# Patient Record
Sex: FEMALE | Race: WHITE | NOT HISPANIC OR LATINO | URBAN - METROPOLITAN AREA
[De-identification: names, ages, dates, MRNs, and addresses within clinical notes are randomized per-mention and may not be internally consistent; named-entity substitution may affect disease eponyms.]

---

## 2017-01-16 ENCOUNTER — PRENATAL OFFICE VISIT - HEALTHEAST (OUTPATIENT)
Dept: MIDWIFE SERVICES | Facility: CLINIC | Age: 27
End: 2017-01-16

## 2017-01-16 DIAGNOSIS — O09.891 SHORT INTERVAL BETWEEN PREGNANCIES AFFECTING PREGNANCY IN FIRST TRIMESTER, ANTEPARTUM: ICD-10-CM

## 2017-01-16 DIAGNOSIS — E55.9 VITAMIN D DEFICIENCY: ICD-10-CM

## 2017-01-16 ASSESSMENT — MIFFLIN-ST. JEOR: SCORE: 1418.28

## 2017-01-17 ENCOUNTER — AMBULATORY - HEALTHEAST (OUTPATIENT)
Dept: LAB | Facility: CLINIC | Age: 27
End: 2017-01-17

## 2017-01-17 DIAGNOSIS — O09.891 SHORT INTERVAL BETWEEN PREGNANCIES AFFECTING PREGNANCY IN FIRST TRIMESTER, ANTEPARTUM: ICD-10-CM

## 2017-01-20 ENCOUNTER — COMMUNICATION - HEALTHEAST (OUTPATIENT)
Dept: MIDWIFE SERVICES | Facility: CLINIC | Age: 27
End: 2017-01-20

## 2017-01-20 DIAGNOSIS — Z34.82 ENCOUNTER FOR SUPERVISION OF OTHER NORMAL PREGNANCY IN SECOND TRIMESTER: ICD-10-CM

## 2017-02-10 ENCOUNTER — HOSPITAL ENCOUNTER (OUTPATIENT)
Dept: ULTRASOUND IMAGING | Facility: HOSPITAL | Age: 27
Discharge: HOME | End: 2017-02-10
Attending: ADVANCED PRACTICE MIDWIFE

## 2017-02-10 DIAGNOSIS — Z34.82 ENCOUNTER FOR SUPERVISION OF OTHER NORMAL PREGNANCY IN SECOND TRIMESTER: ICD-10-CM

## 2017-02-13 ENCOUNTER — PRENATAL OFFICE VISIT - HEALTHEAST (OUTPATIENT)
Dept: MIDWIFE SERVICES | Facility: CLINIC | Age: 27
End: 2017-02-13

## 2017-02-13 ENCOUNTER — AMBULATORY - HEALTHEAST (OUTPATIENT)
Dept: MIDWIFE SERVICES | Facility: CLINIC | Age: 27
End: 2017-02-13

## 2017-02-13 DIAGNOSIS — O09.892 SHORT INTERVAL BETWEEN PREGNANCIES COMPLICATING PREGNANCY, ANTEPARTUM, SECOND TRIMESTER: ICD-10-CM

## 2017-02-13 ASSESSMENT — MIFFLIN-ST. JEOR: SCORE: 1425.99

## 2017-03-20 ENCOUNTER — PRENATAL OFFICE VISIT - HEALTHEAST (OUTPATIENT)
Dept: MIDWIFE SERVICES | Facility: CLINIC | Age: 27
End: 2017-03-20

## 2017-03-20 DIAGNOSIS — O09.891 SHORT INTERVAL BETWEEN PREGNANCIES AFFECTING PREGNANCY IN FIRST TRIMESTER, ANTEPARTUM: ICD-10-CM

## 2017-03-20 ASSESSMENT — MIFFLIN-ST. JEOR: SCORE: 1454.12

## 2017-04-17 ENCOUNTER — PRENATAL OFFICE VISIT - HEALTHEAST (OUTPATIENT)
Dept: MIDWIFE SERVICES | Facility: CLINIC | Age: 27
End: 2017-04-17

## 2017-04-17 DIAGNOSIS — Z34.80 ENCOUNTER FOR SUPERVISION OF OTHER NORMAL PREGNANCY: ICD-10-CM

## 2017-04-17 DIAGNOSIS — Z00.00 HEALTH CARE MAINTENANCE: ICD-10-CM

## 2017-04-17 ASSESSMENT — MIFFLIN-ST. JEOR: SCORE: 1480.2

## 2017-04-18 LAB — SYPHILIS RPR SCREEN - HISTORICAL: NORMAL

## 2017-05-15 ENCOUNTER — PRENATAL OFFICE VISIT - HEALTHEAST (OUTPATIENT)
Dept: MIDWIFE SERVICES | Facility: CLINIC | Age: 27
End: 2017-05-15

## 2017-05-15 DIAGNOSIS — Z34.83 ENCOUNTER FOR SUPERVISION OF OTHER NORMAL PREGNANCY IN THIRD TRIMESTER: ICD-10-CM

## 2017-05-15 ASSESSMENT — MIFFLIN-ST. JEOR: SCORE: 1516.94

## 2017-05-31 ENCOUNTER — PRENATAL OFFICE VISIT - HEALTHEAST (OUTPATIENT)
Dept: MIDWIFE SERVICES | Facility: CLINIC | Age: 27
End: 2017-05-31

## 2017-05-31 DIAGNOSIS — Z34.83 ENCOUNTER FOR SUPERVISION OF OTHER NORMAL PREGNANCY, THIRD TRIMESTER: ICD-10-CM

## 2017-05-31 ASSESSMENT — MIFFLIN-ST. JEOR: SCORE: 1520.11

## 2017-06-12 ENCOUNTER — HOSPITAL ENCOUNTER (OUTPATIENT)
Dept: ULTRASOUND IMAGING | Facility: HOSPITAL | Age: 27
Discharge: HOME | End: 2017-06-12
Attending: ADVANCED PRACTICE MIDWIFE

## 2017-06-12 ENCOUNTER — COMMUNICATION - HEALTHEAST (OUTPATIENT)
Dept: OBGYN | Facility: CLINIC | Age: 27
End: 2017-06-12

## 2017-06-12 ENCOUNTER — PRENATAL OFFICE VISIT - HEALTHEAST (OUTPATIENT)
Dept: MIDWIFE SERVICES | Facility: CLINIC | Age: 27
End: 2017-06-12

## 2017-06-12 DIAGNOSIS — Z34.80 ENCOUNTER FOR SUPERVISION OF OTHER NORMAL PREGNANCY: ICD-10-CM

## 2017-06-12 DIAGNOSIS — O32.9XX0: ICD-10-CM

## 2017-06-12 ASSESSMENT — MIFFLIN-ST. JEOR: SCORE: 1535.08

## 2017-06-19 ENCOUNTER — PRENATAL OFFICE VISIT - HEALTHEAST (OUTPATIENT)
Dept: MIDWIFE SERVICES | Facility: CLINIC | Age: 27
End: 2017-06-19

## 2017-06-19 ENCOUNTER — COMMUNICATION - HEALTHEAST (OUTPATIENT)
Dept: ADMINISTRATIVE | Facility: CLINIC | Age: 27
End: 2017-06-19

## 2017-06-19 DIAGNOSIS — Z34.83 ENCOUNTER FOR SUPERVISION OF OTHER NORMAL PREGNANCY, THIRD TRIMESTER: ICD-10-CM

## 2017-06-19 DIAGNOSIS — O32.0XX0 UNSTABLE LIE OF FETUS: ICD-10-CM

## 2017-06-19 DIAGNOSIS — O32.0XX0: ICD-10-CM

## 2017-06-19 ASSESSMENT — MIFFLIN-ST. JEOR: SCORE: 1548.69

## 2017-06-25 ENCOUNTER — COMMUNICATION - HEALTHEAST (OUTPATIENT)
Dept: MIDWIFE SERVICES | Facility: CLINIC | Age: 27
End: 2017-06-25

## 2017-06-26 ENCOUNTER — RECORDS - HEALTHEAST (OUTPATIENT)
Dept: ADMINISTRATIVE | Facility: OTHER | Age: 27
End: 2017-06-26

## 2017-06-26 ENCOUNTER — PRENATAL OFFICE VISIT - HEALTHEAST (OUTPATIENT)
Dept: MIDWIFE SERVICES | Facility: CLINIC | Age: 27
End: 2017-06-26

## 2017-06-26 DIAGNOSIS — Z34.83 ENCOUNTER FOR SUPERVISION OF OTHER NORMAL PREGNANCY, THIRD TRIMESTER: ICD-10-CM

## 2017-06-26 DIAGNOSIS — B37.31 VAGINAL YEAST INFECTION: ICD-10-CM

## 2017-06-26 DIAGNOSIS — R30.0 DYSURIA: ICD-10-CM

## 2017-06-26 ASSESSMENT — MIFFLIN-ST. JEOR: SCORE: 1560.94

## 2017-06-28 ENCOUNTER — AMBULATORY - HEALTHEAST (OUTPATIENT)
Dept: MIDWIFE SERVICES | Facility: CLINIC | Age: 27
End: 2017-06-28

## 2017-07-02 ENCOUNTER — HOSPITAL ENCOUNTER (OUTPATIENT)
Dept: MEDSURG UNIT | Facility: CLINIC | Age: 27
Discharge: HOME | End: 2017-07-02
Attending: MIDWIFE | Admitting: MIDWIFE

## 2017-07-02 DIAGNOSIS — O09.891 SHORT INTERVAL BETWEEN PREGNANCIES AFFECTING PREGNANCY IN FIRST TRIMESTER, ANTEPARTUM: ICD-10-CM

## 2017-07-02 DIAGNOSIS — Z34.83 ENCOUNTER FOR SUPERVISION OF OTHER NORMAL PREGNANCY, THIRD TRIMESTER: ICD-10-CM

## 2017-07-02 DIAGNOSIS — B37.31 VAGINAL YEAST INFECTION: ICD-10-CM

## 2017-07-02 DIAGNOSIS — O32.0XX0: ICD-10-CM

## 2017-07-02 DIAGNOSIS — E55.9 VITAMIN D DEFICIENCY: ICD-10-CM

## 2017-07-02 ASSESSMENT — MIFFLIN-ST. JEOR: SCORE: 1557.76

## 2017-07-03 ENCOUNTER — PRENATAL OFFICE VISIT - HEALTHEAST (OUTPATIENT)
Dept: MIDWIFE SERVICES | Facility: CLINIC | Age: 27
End: 2017-07-03

## 2017-07-03 DIAGNOSIS — Z34.83 ENCOUNTER FOR SUPERVISION OF OTHER NORMAL PREGNANCY, THIRD TRIMESTER: ICD-10-CM

## 2017-07-03 ASSESSMENT — MIFFLIN-ST. JEOR: SCORE: 1558.67

## 2017-07-10 ENCOUNTER — PRENATAL OFFICE VISIT - HEALTHEAST (OUTPATIENT)
Dept: MIDWIFE SERVICES | Facility: CLINIC | Age: 27
End: 2017-07-10

## 2017-07-10 DIAGNOSIS — Z34.83 ENCOUNTER FOR SUPERVISION OF OTHER NORMAL PREGNANCY, THIRD TRIMESTER: ICD-10-CM

## 2017-07-10 ASSESSMENT — MIFFLIN-ST. JEOR: SCORE: 1567.29

## 2017-07-14 ENCOUNTER — COMMUNICATION - HEALTHEAST (OUTPATIENT)
Dept: OBGYN | Facility: CLINIC | Age: 27
End: 2017-07-14

## 2017-07-17 ENCOUNTER — COMMUNICATION - HEALTHEAST (OUTPATIENT)
Dept: OBGYN | Facility: CLINIC | Age: 27
End: 2017-07-17

## 2017-07-20 ENCOUNTER — COMMUNICATION - HEALTHEAST (OUTPATIENT)
Dept: OBGYN | Facility: HOSPITAL | Age: 27
End: 2017-07-20

## 2017-08-21 ENCOUNTER — OFFICE VISIT - HEALTHEAST (OUTPATIENT)
Dept: MIDWIFE SERVICES | Facility: CLINIC | Age: 27
End: 2017-08-21

## 2017-08-21 ASSESSMENT — MIFFLIN-ST. JEOR: SCORE: 1444.36

## 2017-08-28 ENCOUNTER — OFFICE VISIT - HEALTHEAST (OUTPATIENT)
Dept: MIDWIFE SERVICES | Facility: CLINIC | Age: 27
End: 2017-08-28

## 2017-08-28 DIAGNOSIS — Z30.430 ENCOUNTER FOR IUD INSERTION: ICD-10-CM

## 2017-08-28 ASSESSMENT — MIFFLIN-ST. JEOR: SCORE: 1481.56

## 2017-10-09 ENCOUNTER — OFFICE VISIT - HEALTHEAST (OUTPATIENT)
Dept: MIDWIFE SERVICES | Facility: CLINIC | Age: 27
End: 2017-10-09

## 2017-10-09 DIAGNOSIS — O92.29 POSTPARTUM NIPPLE PAIN: ICD-10-CM

## 2017-10-09 DIAGNOSIS — Z00.00 HEALTH CARE MAINTENANCE: ICD-10-CM

## 2017-10-09 DIAGNOSIS — Z30.431 IUD CHECK UP: ICD-10-CM

## 2017-10-09 ASSESSMENT — MIFFLIN-ST. JEOR: SCORE: 1503.33

## 2017-10-27 ENCOUNTER — COMMUNICATION - HEALTHEAST (OUTPATIENT)
Dept: MIDWIFE SERVICES | Facility: CLINIC | Age: 27
End: 2017-10-27

## 2017-11-03 ENCOUNTER — COMMUNICATION - HEALTHEAST (OUTPATIENT)
Dept: OBGYN | Facility: CLINIC | Age: 27
End: 2017-11-03

## 2017-11-06 ENCOUNTER — OFFICE VISIT - HEALTHEAST (OUTPATIENT)
Dept: MIDWIFE SERVICES | Facility: CLINIC | Age: 27
End: 2017-11-06

## 2017-11-06 DIAGNOSIS — Z30.432 ENCOUNTER FOR IUD REMOVAL: ICD-10-CM

## 2017-11-06 DIAGNOSIS — Z30.09 GENERAL COUNSELING FOR PRESCRIPTION OF ORAL CONTRACEPTIVES: ICD-10-CM

## 2017-11-06 ASSESSMENT — MIFFLIN-ST. JEOR: SCORE: 1505.14

## 2018-10-22 ENCOUNTER — AMBULATORY - HEALTHEAST (OUTPATIENT)
Dept: MIDWIFE SERVICES | Facility: CLINIC | Age: 28
End: 2018-10-22

## 2018-10-22 ENCOUNTER — PRENATAL OFFICE VISIT - HEALTHEAST (OUTPATIENT)
Dept: MIDWIFE SERVICES | Facility: CLINIC | Age: 28
End: 2018-10-22

## 2018-10-22 DIAGNOSIS — Z34.80 SUPERVISION OF OTHER NORMAL PREGNANCY, ANTEPARTUM: ICD-10-CM

## 2018-10-22 LAB
BASOPHILS # BLD AUTO: 0 THOU/UL (ref 0–0.2)
BASOPHILS NFR BLD AUTO: 0 % (ref 0–2)
EOSINOPHIL # BLD AUTO: 0.1 THOU/UL (ref 0–0.4)
EOSINOPHIL NFR BLD AUTO: 1 % (ref 0–6)
ERYTHROCYTE [DISTWIDTH] IN BLOOD BY AUTOMATED COUNT: 12.2 % (ref 11–14.5)
HCT VFR BLD AUTO: 39.4 % (ref 35–47)
HGB BLD-MCNC: 13.7 G/DL (ref 12–16)
LYMPHOCYTES # BLD AUTO: 2.3 THOU/UL (ref 0.8–4.4)
LYMPHOCYTES NFR BLD AUTO: 29 % (ref 20–40)
MCH RBC QN AUTO: 31.6 PG (ref 27–34)
MCHC RBC AUTO-ENTMCNC: 34.8 G/DL (ref 32–36)
MCV RBC AUTO: 91 FL (ref 80–100)
MONOCYTES # BLD AUTO: 0.5 THOU/UL (ref 0–0.9)
MONOCYTES NFR BLD AUTO: 7 % (ref 2–10)
NEUTROPHILS # BLD AUTO: 5 THOU/UL (ref 2–7.7)
NEUTROPHILS NFR BLD AUTO: 63 % (ref 50–70)
PLATELET # BLD AUTO: 214 THOU/UL (ref 140–440)
PMV BLD AUTO: 10.9 FL (ref 8.5–12.5)
RBC # BLD AUTO: 4.34 MILL/UL (ref 3.8–5.4)
WBC: 8 THOU/UL (ref 4–11)

## 2018-10-22 ASSESSMENT — MIFFLIN-ST. JEOR: SCORE: 1439.37

## 2018-10-23 LAB
ABO/RH(D): NORMAL
ABORH REPEAT: NORMAL
ANTIBODY SCREEN: NEGATIVE
BACTERIA SPEC CULT: NO GROWTH
HIV 1+2 AB+HIV1 P24 AG SERPL QL IA: NEGATIVE

## 2018-10-24 LAB
HBV SURFACE AG SERPL QL IA: NEGATIVE
RUBV IGG SERPL QL IA: POSITIVE
T PALLIDUM AB SER QL: NEGATIVE

## 2018-10-26 ENCOUNTER — AMBULATORY - HEALTHEAST (OUTPATIENT)
Dept: MIDWIFE SERVICES | Facility: CLINIC | Age: 28
End: 2018-10-26

## 2018-10-26 ENCOUNTER — COMMUNICATION - HEALTHEAST (OUTPATIENT)
Dept: MIDWIFE SERVICES | Facility: CLINIC | Age: 28
End: 2018-10-26

## 2018-10-26 DIAGNOSIS — Z34.80 SUPERVISION OF OTHER NORMAL PREGNANCY, ANTEPARTUM: ICD-10-CM

## 2018-10-29 ENCOUNTER — COMMUNICATION - HEALTHEAST (OUTPATIENT)
Dept: MIDWIFE SERVICES | Facility: CLINIC | Age: 28
End: 2018-10-29

## 2018-12-10 ENCOUNTER — COMMUNICATION - HEALTHEAST (OUTPATIENT)
Dept: MIDWIFE SERVICES | Facility: CLINIC | Age: 28
End: 2018-12-10

## 2018-12-10 ENCOUNTER — PRENATAL OFFICE VISIT - HEALTHEAST (OUTPATIENT)
Dept: MIDWIFE SERVICES | Facility: CLINIC | Age: 28
End: 2018-12-10

## 2018-12-10 DIAGNOSIS — Z34.82 ENCOUNTER FOR SUPERVISION OF OTHER NORMAL PREGNANCY IN SECOND TRIMESTER: ICD-10-CM

## 2018-12-10 DIAGNOSIS — R51.9 PERSISTENT HEADACHES: ICD-10-CM

## 2018-12-10 DIAGNOSIS — Z23 NEED FOR INFLUENZA VACCINATION: ICD-10-CM

## 2018-12-10 ASSESSMENT — MIFFLIN-ST. JEOR: SCORE: 1448.9

## 2018-12-12 LAB
AFP MOM: 0.68 MOM
ALPHA FETO PROTEIN: 23.9 NG/ML
CALCULATED AGE AT EDD: NORMAL
COLLECTION DATE: NORMAL
CURRENT CIGARETTE SMOKING STATUS: NORMAL
DOWN SYNDROME MATERNAL AGE RISK: NORMAL
DOWN SYNDROME SCREEN RISK ESTIMATE: NORMAL
EDD BY U/S SCAN: NORMAL
GA ON COLLECTION BY U/S SCAN: NORMAL WK,D
GA USED IN RISK ESTIMATE: NORMAL
GENERAL TEST INFORMATION: NORMAL
HCG, TOTAL MOM: 0.85 MOM
HCG, TOTAL: 24.4 IU/ML
INHIBIN MOM: 0.92 MOM
INHIBIN: 149 PG/ML
INITIAL OR REPEAT TESTING: NORMAL
INSULIN DIABETIC: NO
INTERPRETATION: NORMAL
IVF PREGNANCY: NO
Lab: NORMAL
NEURAL TUBE DEFECT RISK ESTIMATE: NORMAL
NUMBER OF CHORIONS: NORMAL
NUMBER OF FETUSES:: 1
PATIENT OR FATHER OF BABY HAS A NTD: NO
PATIENT WEIGHT: 157 LBS
PHYSICIAN PHONE NUMBER: NORMAL
PREV DOWN (T21) / TRISOMY PREGNANCY: NORMAL
PREV PREGNANCY W/ NEURAL TUBE DEFECT: NO
PT DATE OF BIRTH: NORMAL
RACE OF MOTHER: NORMAL
RECOMMENDED FOLLOW UP: NORMAL
TRISOMY 18 SCREEN RISK ESTIMATE: NORMAL
UE3 MOM: 0.98 MOM
UE3: 0.92 NG/ML

## 2018-12-13 ENCOUNTER — COMMUNICATION - HEALTHEAST (OUTPATIENT)
Dept: ADMINISTRATIVE | Facility: CLINIC | Age: 28
End: 2018-12-13

## 2018-12-27 ENCOUNTER — AMBULATORY - HEALTHEAST (OUTPATIENT)
Dept: OBGYN | Facility: CLINIC | Age: 28
End: 2018-12-27

## 2018-12-27 DIAGNOSIS — Z34.82 ENCOUNTER FOR SUPERVISION OF OTHER NORMAL PREGNANCY IN SECOND TRIMESTER: ICD-10-CM

## 2018-12-31 ENCOUNTER — AMBULATORY - HEALTHEAST (OUTPATIENT)
Dept: MIDWIFE SERVICES | Facility: CLINIC | Age: 28
End: 2018-12-31

## 2019-01-07 ENCOUNTER — COMMUNICATION - HEALTHEAST (OUTPATIENT)
Dept: ADMINISTRATIVE | Facility: CLINIC | Age: 29
End: 2019-01-07

## 2019-01-09 ENCOUNTER — COMMUNICATION - HEALTHEAST (OUTPATIENT)
Dept: MIDWIFE SERVICES | Facility: CLINIC | Age: 29
End: 2019-01-09

## 2019-01-21 ENCOUNTER — COMMUNICATION - HEALTHEAST (OUTPATIENT)
Dept: MIDWIFE SERVICES | Facility: CLINIC | Age: 29
End: 2019-01-21

## 2019-01-21 ENCOUNTER — PRENATAL OFFICE VISIT - HEALTHEAST (OUTPATIENT)
Dept: MIDWIFE SERVICES | Facility: CLINIC | Age: 29
End: 2019-01-21

## 2019-01-21 DIAGNOSIS — Z34.80 SUPERVISION OF OTHER NORMAL PREGNANCY, ANTEPARTUM: ICD-10-CM

## 2019-01-21 ASSESSMENT — MIFFLIN-ST. JEOR: SCORE: 1477.02

## 2019-02-18 ENCOUNTER — PRENATAL OFFICE VISIT - HEALTHEAST (OUTPATIENT)
Dept: MIDWIFE SERVICES | Facility: CLINIC | Age: 29
End: 2019-02-18

## 2019-02-18 DIAGNOSIS — Z34.80 SUPERVISION OF OTHER NORMAL PREGNANCY, ANTEPARTUM: ICD-10-CM

## 2019-02-18 LAB
FASTING STATUS PATIENT QL REPORTED: NO
GLUCOSE 1H P 50 G GLC PO SERPL-MCNC: 110 MG/DL (ref 70–139)
HGB BLD-MCNC: 11.5 G/DL (ref 12–16)

## 2019-02-18 ASSESSMENT — MIFFLIN-ST. JEOR: SCORE: 1503.33

## 2019-02-19 ENCOUNTER — COMMUNICATION - HEALTHEAST (OUTPATIENT)
Dept: MIDWIFE SERVICES | Facility: CLINIC | Age: 29
End: 2019-02-19

## 2019-02-19 LAB
HCV AB SERPL QL IA: NEGATIVE
T PALLIDUM AB SER QL: NEGATIVE

## 2019-03-18 ENCOUNTER — PRENATAL OFFICE VISIT - HEALTHEAST (OUTPATIENT)
Dept: MIDWIFE SERVICES | Facility: CLINIC | Age: 29
End: 2019-03-18

## 2019-03-18 DIAGNOSIS — Z34.80 SUPERVISION OF OTHER NORMAL PREGNANCY, ANTEPARTUM: ICD-10-CM

## 2019-03-18 ASSESSMENT — MIFFLIN-ST. JEOR: SCORE: 1535.08

## 2019-04-01 ENCOUNTER — PRENATAL OFFICE VISIT - HEALTHEAST (OUTPATIENT)
Dept: MIDWIFE SERVICES | Facility: CLINIC | Age: 29
End: 2019-04-01

## 2019-04-01 DIAGNOSIS — Z34.80 SUPERVISION OF OTHER NORMAL PREGNANCY, ANTEPARTUM: ICD-10-CM

## 2019-04-15 ENCOUNTER — PRENATAL OFFICE VISIT - HEALTHEAST (OUTPATIENT)
Dept: MIDWIFE SERVICES | Facility: CLINIC | Age: 29
End: 2019-04-15

## 2019-04-15 DIAGNOSIS — Z34.80 SUPERVISION OF OTHER NORMAL PREGNANCY, ANTEPARTUM: ICD-10-CM

## 2019-04-15 DIAGNOSIS — L29.9 ITCHING: ICD-10-CM

## 2019-04-15 LAB
ALT SERPL W P-5'-P-CCNC: <9 U/L (ref 0–45)
AST SERPL W P-5'-P-CCNC: 16 U/L (ref 0–40)

## 2019-04-15 ASSESSMENT — MIFFLIN-ST. JEOR: SCORE: 1562.3

## 2019-04-17 LAB — BILE AC SERPL-SCNC: 4 UMOL/L (ref 0–10)

## 2019-04-29 ENCOUNTER — PRENATAL OFFICE VISIT - HEALTHEAST (OUTPATIENT)
Dept: MIDWIFE SERVICES | Facility: CLINIC | Age: 29
End: 2019-04-29

## 2019-04-29 DIAGNOSIS — Z34.80 SUPERVISION OF OTHER NORMAL PREGNANCY, ANTEPARTUM: ICD-10-CM

## 2019-04-29 LAB — HGB BLD-MCNC: 11.1 G/DL (ref 12–16)

## 2019-04-30 LAB
ALLERGIC TO PENICILLIN: NO
GP B STREP DNA SPEC QL NAA+PROBE: POSITIVE

## 2019-05-06 ENCOUNTER — PRENATAL OFFICE VISIT - HEALTHEAST (OUTPATIENT)
Dept: MIDWIFE SERVICES | Facility: CLINIC | Age: 29
End: 2019-05-06

## 2019-05-06 DIAGNOSIS — Z34.80 SUPERVISION OF OTHER NORMAL PREGNANCY, ANTEPARTUM: ICD-10-CM

## 2019-05-13 ENCOUNTER — PRENATAL OFFICE VISIT - HEALTHEAST (OUTPATIENT)
Dept: MIDWIFE SERVICES | Facility: CLINIC | Age: 29
End: 2019-05-13

## 2019-05-13 DIAGNOSIS — Z34.80 SUPERVISION OF OTHER NORMAL PREGNANCY, ANTEPARTUM: ICD-10-CM

## 2019-05-20 ENCOUNTER — PRENATAL OFFICE VISIT - HEALTHEAST (OUTPATIENT)
Dept: MIDWIFE SERVICES | Facility: CLINIC | Age: 29
End: 2019-05-20

## 2019-05-20 DIAGNOSIS — Z34.80 SUPERVISION OF OTHER NORMAL PREGNANCY, ANTEPARTUM: ICD-10-CM

## 2019-05-20 ASSESSMENT — MIFFLIN-ST. JEOR: SCORE: 1587.7

## 2019-05-28 ENCOUNTER — PRENATAL OFFICE VISIT - HEALTHEAST (OUTPATIENT)
Dept: MIDWIFE SERVICES | Facility: CLINIC | Age: 29
End: 2019-05-28

## 2019-05-28 ENCOUNTER — COMMUNICATION - HEALTHEAST (OUTPATIENT)
Dept: MIDWIFE SERVICES | Facility: CLINIC | Age: 29
End: 2019-05-28

## 2019-05-28 DIAGNOSIS — B95.1 GROUP BETA STREP POSITIVE: ICD-10-CM

## 2019-05-28 DIAGNOSIS — Z34.80 SUPERVISION OF OTHER NORMAL PREGNANCY, ANTEPARTUM: ICD-10-CM

## 2019-05-28 DIAGNOSIS — O48.0 POST-TERM PREGNANCY, 40-42 WEEKS OF GESTATION: ICD-10-CM

## 2019-05-28 ASSESSMENT — MIFFLIN-ST. JEOR: SCORE: 1597.68

## 2019-05-31 ENCOUNTER — HOME CARE/HOSPICE - HEALTHEAST (OUTPATIENT)
Dept: HOME HEALTH SERVICES | Facility: HOME HEALTH | Age: 29
End: 2019-05-31

## 2019-06-03 ENCOUNTER — COMMUNICATION - HEALTHEAST (OUTPATIENT)
Dept: OBGYN | Facility: CLINIC | Age: 29
End: 2019-06-03

## 2019-06-13 ENCOUNTER — OFFICE VISIT - HEALTHEAST (OUTPATIENT)
Dept: MIDWIFE SERVICES | Facility: CLINIC | Age: 29
End: 2019-06-13

## 2019-07-11 ENCOUNTER — COMMUNICATION - HEALTHEAST (OUTPATIENT)
Dept: MIDWIFE SERVICES | Facility: CLINIC | Age: 29
End: 2019-07-11

## 2019-07-11 ENCOUNTER — OFFICE VISIT - HEALTHEAST (OUTPATIENT)
Dept: MIDWIFE SERVICES | Facility: CLINIC | Age: 29
End: 2019-07-11

## 2019-07-11 DIAGNOSIS — Z30.017 NEXPLANON INSERTION: ICD-10-CM

## 2019-07-11 DIAGNOSIS — Z01.812 PRE-PROCEDURE LAB EXAM: ICD-10-CM

## 2019-07-11 LAB — HCG UR QL: NEGATIVE

## 2019-07-11 ASSESSMENT — MIFFLIN-ST. JEOR: SCORE: 1535.08

## 2019-07-25 ENCOUNTER — COMMUNICATION - HEALTHEAST (OUTPATIENT)
Dept: MIDWIFE SERVICES | Facility: CLINIC | Age: 29
End: 2019-07-25

## 2020-08-25 ENCOUNTER — OFFICE VISIT - HEALTHEAST (OUTPATIENT)
Dept: MIDWIFE SERVICES | Facility: CLINIC | Age: 30
End: 2020-08-25

## 2020-08-25 DIAGNOSIS — Z30.46 NEXPLANON REMOVAL: ICD-10-CM

## 2020-08-25 RX ORDER — CETIRIZINE HYDROCHLORIDE 10 MG/1
10 TABLET ORAL PRN
Status: SHIPPED | COMMUNITY
Start: 2020-08-25

## 2020-08-25 ASSESSMENT — MIFFLIN-ST. JEOR: SCORE: 1412.61

## 2021-05-27 NOTE — PROGRESS NOTES
"Zahraa presents to clinic with her two young children.  She reports active fetal movement.  She has been experiencing an itchy abdomen for the past few weeks.  Over the past one week she has had mild itching on the palms of her hands and on the soles of her feet. I asked when this occurs and she reports it happens at night time. Discussed s/sx of cholestasis of pregnancy and recommended labs to determine a dx.  Bile acids, AST, and ALT ordered.    Weight gain is slightly above recommended limit today.  She has been exercising and using a \"beach body\" work out. She modifies parts of the workout to make it comfortable for her pregnant body.  Commended her for exercising.     EPDS=3.  Patient reports her mood is stable.   Pregnancy checklist updated.  Contraceptive plan:  Vasectomy.  Will likely use POPs following delivery.    Preregistration not yet done.  Encouraged her to do this.   She has a carseat.   3rd trimester warning signs and sx reviewed.  Discuss ordering BPP with patient per her preference at the next visit. Reviewed that her fundal height measurement is on track as of today and that this coupled with her exam in clinic does not indicate ordering a BPP. Return to clinic in 2 weeks. Please ask about birth plan at next visit.     "

## 2021-05-27 NOTE — PATIENT INSTRUCTIONS - HE
Patient Education   HEALTHY PREGNANCY CARE: 34-36 WEEKS PREGNANT    Your baby is gaining about an ounce each day, so healthy nutrition and rest are still very important. Learn about late pregnancy symptoms, such as constipation and backaches. Drinking more fluids and eating more fiber can relieve constipation. The pelvic tilt and a heating pad can ease backaches.    Continue to watch for signs and symptoms of preeclampsia:     Sudden swelling of your face, hands, or feet     New vision problems such as blurring, double vision, or flashing lights    A severe headache not relieved with acetaminophen (Tylenol)    Sharp or stabbing pain in your right or middle upper abdomen    You're almost done with your pregnancy but it's still too soon to have your baby. The goal is to have your baby after 37 weeks, so watch for signs and symptoms of premature labor:     Regular contractions. This means having about 6 or more within 1 hour, even after you have had a glass of water and are resting.     A backache that starts and stops regularly.    An increase or change in vaginal discharge, such as heavy, mucus-like, watery, or bloody discharge.     Your water breaks or leaks.    You will be tested for group B streptococcus (GBS), a type of bacteria found in 10-30% of pregnant women. A woman with GBS can pass it to her baby during delivery. Most babies who get GBS from their mothers do not have any problems, but some babies get very ill and need to be hospitalized for antibiotic treatment. Treating you with antibiotics during labor and delivery helps to prevent infection in your baby.    Review information on postpartum care that you will need after the baby is born.  Discuss your choices and plans for birth control with your midwife or physician.     Postpartum Care  During the days and weeks after the delivery of your baby (postpartum period), your body will change as it returns to its nonpregnant condition. As with pregnancy  "changes, postpartum changes are different for every woman.    Physical changes after childbirth  The changes in your body may include:    Contractions called afterpains shrink the uterus for several days after childbirth. Shrinking of the uterus to its prepregnancy size may take 6 to 8 weeks.    Sore muscles (especially in the arms, neck, or jaw) are common after childbirth. This is because of the hard work of labor and pushing your baby out. The soreness should go away in a few days.    Bleeding and vaginal discharge (lochia) may last for 2 to 8 weeks, and can come and go for about 2 months.    Vaginal soreness, including pain, discomfort, and numbness, is common after vaginal birth. Soreness may be worse if you had a perineal tear or episiotomy.    If you had a  birth (), you may have pain in your lower abdomen and may need pain medicine for 1 to 2 weeks.    Breast engorgement is common around the 3rd or 4th day after delivery, when the breasts begin to fill with milk. This can cause discomfort and swelling. If you are breast feeding, the best treatment is to feed your baby often or pump the milk out. You can also use warm compresses. If you are not breast feeding, placing ice packs on your breasts, taking a hot shower, or using warm compresses may relieve the discomfort.    Be aware of postpartum depression    \"Baby blues\" are common for the first 1 to 2 weeks after birth. You may cry or feel sad or irritable for no reason.     For some women, these feelings last longer and are more intense. This is called postpartum depression.     If your symptoms last for more than a few weeks or you feel very depressed, ask your midwife or physician for help.     Postpartum depression can be treated. Support groups and counseling can help, but sometimes medication is needed.     Discuss follow-up appointments with your midwife or physician, as well. He or she will usually want to see you 6 weeks after the " birth of your baby, sooner if you are having problems.    If you have questions about any symptoms you are experiencing or any other concerns, call your provider or their clinic staff at [facility] at Phone: [phone]. If it's after clinic hours, physician patients should call the [facility] at [phone] ([phone]); midwife patients should call their answering service at [phone].    How can you care for yourself at home?   You can refer to the Starting Out Right book or find it online at [email or web address]-Right.[email or web address] or [email or web address]-starting-out-right.html#[email or web address]     You can sign up for a weekly parenting e-mail that gives support, tips and advice from health care professionals that starts with pregnancy and continues through the toddler years. To register, go to [email or web address] at any time during your pregnancy.        Making Plans for Feeding My Baby    By this point, you probably have read a lot about feeding your baby.  Breastfeed or formula? Each mother s decision is her own and [facility] respects you and your choices. We ve gathered information on both breastfeeding and formula feeding to help with your decision. Talking with your physician or nurse-midwife can also help in your decision.  However you plan to feed your baby, [facility] encourage rooming in with your baby, skin-to-skin contact and feeding your baby based on his or her cues.    Skin-to-skin contact  Being close to mom helps your baby adjust to life outside of the womb.  It helps your baby regulate their body temperature, heart rate, and breathing.  Your baby will usually be placed skin-to-skin immediately following birth or as soon as possible, if medical intervention is needed.    Rooming-In  Having your baby stay with you  in your room is called  rooming-in .  Keeping your baby in your room helps you to learn how to care for your baby by getting to know your baby s cues, body rhythms and sleep cycle.       Cue-based feeding  Cues (signals) are baby s way of telling you what he or she wants.  When you learn your infant s cues, you know how to care for and feed your baby.   Feeding cues are the licking and smacking of lips, bringing their fist to their mouth, and a reflex called  rooting - where baby turns and opens his or her mouth, searching for the breast or bottle.  Crying is a late feeding cue.  Babies can feed frequently, often at least 8 times in 24 hours.  Breastfeeding facts  Breast milk is the best source of nutrition for your baby and is available at birth. In the first couple of days, your milk volume is already starting to increase, though it may not be noticeable. Breastfeed frequently to increase your milk supply. Within three to five days, you will begin to notice larger milk volumes. An increase in breast size, heaviness and firmness are often described as the milk  coming in.  Frequent breastfeeding can help breasts from getting overly firm and painful. You will know the baby is getting enough milk if your baby is having wet and dirty diapers and gaining weight.     If your goal is to exclusively breastfeed, it is important to not use any formula or artificial nipples (including bottles and pacifiers) while your baby is learning to breastfeed.  While it may seem like an  easy  option to give your baby a bottle, formula should only be given if there is a medical reason for your baby to have it.    Positioning and attachment   Get comfortable.  Use pillows as needed to support your arms and baby.  Hold baby close at the level of your breast, facing you in a tummy to tummy position.  Skin to skin helps with this.  Position the baby with his or her nose by the nipple.  There should be a straight line from baby s ear to  shoulder to hips.  Tickle your baby s lips or wait for baby to open mouth wide, bring baby to breast by leading with the chin.  Aim the nipple at the roof of baby s mouth.  A rapid sucking pattern is followed by longer, drawing pattern with occasional swallows heard.  When baby is correctly latched, your nipple and much of the areola are pulled well into baby s mouth.      Returning to work or school  Focus on a good start to breastfeeding.  Many women continue to provide breastmilk for their baby when they return to work or school.  Making plans about where to pump and store milk can make the transition go well.  Talk with other mothers who have also returned to work or school for tips and support.  Your employer s Human Resource department may be a resource as well.     Returning to work or school: (continued)     babies can mean fewer  sick  days for you.    A quality breast pump will also save time and add comfort.  Check with your insurance prior to giving birth for breast pump coverage.  Many insurance companies include a pump within your benefits.    Wait until your baby is at least three weeks old to introduce a bottle for the first time.  Have someone besides you give the bottle.    Breastfeed when you are with your baby. Reserve your bottles of breast milk for when you are away.     Your breasts will need to be  emptied  either by your baby or a pump.  Plan to pump at least twice in an eight hour day.    If you cannot pump at work, continue breastfeeding at home. Any amount of breast milk is worth giving to your baby.    Formula feeding facts  If you are planning to use formula to feed your baby, you will want to make some preparations ahead of time. Talk to your doctor or nurse-midwife about what type of formula to use. Some are iron-fortified, meaning they have extra iron in them. You will want to purchase formula and bottles before your baby is born to be sure you are ready after you return  from the hospital. The [place] do not provide formula samples to take home.    Be sure to follow formula mixing directions closely. Regular milk in the dairy case at the grocery store should not be given to babies under 1 year old. Baby formula is sold in several forms including:    Ready-to-use. This is the most expensive, but no mixing is necessary.    Concentrated liquid. This is less expensive than ready-to-use and you mix with water.    Powder. This is the least expensive. You mix one level scoop of powdered formula with two ounces of water and stir well.    Most babies need 2.5 ounces of formula per pound of body weight each day. This means an 8-pound baby may drink about 20 ounces of formula a day; however, this is just an estimate. The most important thing is to pay attention to your baby s cues.  If your baby is always fussy, needs more iron or has certain food allergies, your physician may suggest you change your baby s formula to a different kind.   How do I warm my baby s bottles?  You may feed your baby a bottle without warming it first. It is OK for the breast milk or formula to be cool or room temperature. If your baby seems to prefer it warmed, you can put the filled bottle in a container of warm water and let it stand for a few minutes. Check the temperature of the liquid on your skin before feeding it to your baby; to be sure it isn t too hot. Do not heat bottles in the microwave. Microwaves heat food and liquids unevenly, and this can cause hot spots that can burn your baby.    How do I clean and sterilize bottles?  Sterilize bottles and nipples before you use them for the first time. You can do this by putting them in boiling water for 5 minutes. After that first time, you can wash them in hot and soapy water. Rinse them carefully to be sure there is no soap left on them. You can also wash them in the .    [facility] [phone] ([phone])

## 2021-05-27 NOTE — PATIENT INSTRUCTIONS - HE
Patient Education   HEALTHY PREGNANCY CARE: 30-34 WEEKS PREGNANT    You have made it to the final months of your pregnancy. By now, your baby is starting to fill out with some fat under his skin, fuzzy hair on his shoulders, and is gaining 4 to 6 ounces per week.    Discuss any travel plans with your midwife or physician.    Review possible changes in sexuality during later pregnancy and discuss these with your midwife or physician, as well as your partner. Alternative love-making positions may be more comfortable.    Discuss labor and delivery issues with your midwife or physician. If you had a  birth in the past, discuss a trial of labor with your midwife or physician. He or she may ask that you sign a consent form, if you wish to have a vaginal birth after  (). Ask your midwife or physician to explain your options for managing pain during your labor and delivery. Sometimes, during the birth process, an episiotomy may need to be cut in the vagina to make the opening bigger or let the baby come out quicker. You may want to discuss the episiotomy and how often it is needed with your midwife or physician.    Plan for your baby's care by selecting a child health care provider (Family physician, Pediatrician, or Pediatric Nurse Practitioner). Practice installing an infant car seat correctly in the car. Ask for car seat information as needed and make sure it is safe and will work in the car your baby will ride in. You will need a car seat to bring your baby home from the hospital. Check the procedure for adding your baby to your health care plan. Review your decision about circumcision and ask for any information you need. As you buy and receive items for your baby, don't put a baby walker on your list. Walkers can be dangerous and can cause serious injury to your child. A safer option is a saucer-type play station, since it doesn't allow baby to travel across the floor.    Discuss your choices and  plans for birth control with your midwife or physician. Women who are breastfeeding can still become pregnant. Use a birth control method if you want to lower your pregnancy risk. Talk to your midwife or physician if you are considering permanent birth control, such as tubal ligation or Essure. You may need to complete a consent form 30 days prior to delivery in order to have this done after you deliver.    Continue to watch for signs and symptoms of preeclampsia:     Sudden swelling of your face, hands, or feet     New vision problems such as blurring, double vision, or flashing lights    A severe headache not relieved with acetaminophen (Tylenol)    Sharp or stabbing pain in your right or middle upper abdomen    Watch for signs and symptoms of premature labor:     Regular contractions. This means having about 6 or more within 1 hour, even after you have had a glass of water and are resting.     A backache that starts and stops regularly.    An increase or change in vaginal discharge, such as heavy, mucus-like, watery, or bloody discharge.     Your water breaks or leaks.    If you have any of the above symptoms or any other concerns, call your provider or their clinic staff at [facility] at Phone: [phone]. If it's after clinic hours, physician patients should call the [facility] at [phone] ([phone]); midwife patients should call their answering service at [phone].    How can you care for yourself at home?   You can refer to the Starting Out Right book or find it online at [email or web address] or [email or web address]-starting-out-[email or web address]-starting-out-[email or web address]#p=8     You can sign up for a weekly parenting e-mail that gives support, tips and advice from health care professionals that starts with pregnancy and continues through the toddler years. To register, go to  [email or web address] at any time during your pregnancy.     Patient Education   HEALTHY PREGNANCY CARE: 34-36 WEEKS PREGNANT    Your baby is gaining about an ounce each day, so healthy nutrition and rest are still very important. Learn about late pregnancy symptoms, such as constipation and backaches. Drinking more fluids and eating more fiber can relieve constipation. The pelvic tilt and a heating pad can ease backaches.    Continue to watch for signs and symptoms of preeclampsia:     Sudden swelling of your face, hands, or feet     New vision problems such as blurring, double vision, or flashing lights    A severe headache not relieved with acetaminophen (Tylenol)    Sharp or stabbing pain in your right or middle upper abdomen    You're almost done with your pregnancy but it's still too soon to have your baby. The goal is to have your baby after 37 weeks, so watch for signs and symptoms of premature labor:     Regular contractions. This means having about 6 or more within 1 hour, even after you have had a glass of water and are resting.     A backache that starts and stops regularly.    An increase or change in vaginal discharge, such as heavy, mucus-like, watery, or bloody discharge.     Your water breaks or leaks.    You will be tested for group B streptococcus (GBS), a type of bacteria found in 10-30% of pregnant women. A woman with GBS can pass it to her baby during delivery. Most babies who get GBS from their mothers do not have any problems, but some babies get very ill and need to be hospitalized for antibiotic treatment. Treating you with antibiotics during labor and delivery helps to prevent infection in your baby.    Review information on postpartum care that you will need after the baby is born.  Discuss your choices and plans for birth control with your midwife or physician.     Postpartum Care  During the days and weeks after the delivery of your baby (postpartum period), your body will change as  "it returns to its nonpregnant condition. As with pregnancy changes, postpartum changes are different for every woman.    Physical changes after childbirth  The changes in your body may include:    Contractions called afterpains shrink the uterus for several days after childbirth. Shrinking of the uterus to its prepregnancy size may take 6 to 8 weeks.    Sore muscles (especially in the arms, neck, or jaw) are common after childbirth. This is because of the hard work of labor and pushing your baby out. The soreness should go away in a few days.    Bleeding and vaginal discharge (lochia) may last for 2 to 8 weeks, and can come and go for about 2 months.    Vaginal soreness, including pain, discomfort, and numbness, is common after vaginal birth. Soreness may be worse if you had a perineal tear or episiotomy.    If you had a  birth (), you may have pain in your lower abdomen and may need pain medicine for 1 to 2 weeks.    Breast engorgement is common around the 3rd or 4th day after delivery, when the breasts begin to fill with milk. This can cause discomfort and swelling. If you are breast feeding, the best treatment is to feed your baby often or pump the milk out. You can also use warm compresses. If you are not breast feeding, placing ice packs on your breasts, taking a hot shower, or using warm compresses may relieve the discomfort.    Be aware of postpartum depression    \"Baby blues\" are common for the first 1 to 2 weeks after birth. You may cry or feel sad or irritable for no reason.     For some women, these feelings last longer and are more intense. This is called postpartum depression.     If your symptoms last for more than a few weeks or you feel very depressed, ask your midwife or physician for help.     Postpartum depression can be treated. Support groups and counseling can help, but sometimes medication is needed.     Discuss follow-up appointments with your midwife or physician, as well. " He or she will usually want to see you 6 weeks after the birth of your baby, sooner if you are having problems.    If you have questions about any symptoms you are experiencing or any other concerns, call your provider or their clinic staff at [facility] at Phone: [phone]. If it's after clinic hours, physician patients should call the [facility] at [phone] ([phone]); midwife patients should call their answering service at [phone].    How can you care for yourself at home?   You can refer to the Starting Out Right book or find it online at [email or web address]/[email or web address]-[email or web address]-Right.pdf or http://www.[email or web address]/images/stories/flipbooks/[facility]-starting-out-right/[facility]-starting-out-right.html#p=8     You can sign up for a weekly parenting e-mail that gives support, tips and advice from health care professionals that starts with pregnancy and continues through the toddler years. To register, go to [email or web address] at any time during your pregnancy.        Making Plans for Feeding My Baby    By this point, you probably have read a lot about feeding your baby.  Breastfeed or formula? Each mother s decision is her own and [facility] respects you and your choices. We ve gathered information on both breastfeeding and formula feeding to help with your decision. Talking with your physician or nurse-midwife can also help in your decision.  However you plan to feed your baby, [facility] encourage rooming in with your baby, skin-to-skin contact and feeding your baby based on his or her cues.    Skin-to-skin contact  Being close to mom helps your baby adjust to life outside of the womb.  It helps your baby regulate their body temperature, heart rate, and breathing.  Your baby will usually be placed skin-to-skin immediately following birth or as soon as possible, if medical intervention  is needed.    Rooming-In  Having your baby stay with you in your room is called  rooming-in .  Keeping your baby in your room helps you to learn how to care for your baby by getting to know your baby s cues, body rhythms and sleep cycle.       Cue-based feeding  Cues (signals) are baby s way of telling you what he or she wants.  When you learn your infant s cues, you know how to care for and feed your baby.   Feeding cues are the licking and smacking of lips, bringing their fist to their mouth, and a reflex called  rooting - where baby turns and opens his or her mouth, searching for the breast or bottle.  Crying is a late feeding cue.  Babies can feed frequently, often at least 8 times in 24 hours.  Breastfeeding facts  Breast milk is the best source of nutrition for your baby and is available at birth. In the first couple of days, your milk volume is already starting to increase, though it may not be noticeable. Breastfeed frequently to increase your milk supply. Within three to five days, you will begin to notice larger milk volumes. An increase in breast size, heaviness and firmness are often described as the milk  coming in.  Frequent breastfeeding can help breasts from getting overly firm and painful. You will know the baby is getting enough milk if your baby is having wet and dirty diapers and gaining weight.     If your goal is to exclusively breastfeed, it is important to not use any formula or artificial nipples (including bottles and pacifiers) while your baby is learning to breastfeed.  While it may seem like an  easy  option to give your baby a bottle, formula should only be given if there is a medical reason for your baby to have it.    Positioning and attachment   Get comfortable.  Use pillows as needed to support your arms and baby.  Hold baby close at the level of your breast, facing you in a tummy to tummy position.  Skin to skin helps with this.  Position the baby with his or her nose by the  nipple.  There should be a straight line from baby s ear to shoulder to hips.  Tickle your baby s lips or wait for baby to open mouth wide, bring baby to breast by leading with the chin.  Aim the nipple at the roof of baby s mouth.  A rapid sucking pattern is followed by longer, drawing pattern with occasional swallows heard.  When baby is correctly latched, your nipple and much of the areola are pulled well into baby s mouth.      Returning to work or school  Focus on a good start to breastfeeding.  Many women continue to provide breastmilk for their baby when they return to work or school.  Making plans about where to pump and store milk can make the transition go well.  Talk with other mothers who have also returned to work or school for tips and support.  Your employer s Human Resource department may be a resource as well.     Returning to work or school: (continued)     babies can mean fewer  sick  days for you.    A quality breast pump will also save time and add comfort.  Check with your insurance prior to giving birth for breast pump coverage.  Many insurance companies include a pump within your benefits.    Wait until your baby is at least three weeks old to introduce a bottle for the first time.  Have someone besides you give the bottle.    Breastfeed when you are with your baby. Reserve your bottles of breast milk for when you are away.     Your breasts will need to be  emptied  either by your baby or a pump.  Plan to pump at least twice in an eight hour day.    If you cannot pump at work, continue breastfeeding at home. Any amount of breast milk is worth giving to your baby.    Formula feeding facts  If you are planning to use formula to feed your baby, you will want to make some preparations ahead of time. Talk to your doctor or nurse-midwife about what type of formula to use. Some are iron-fortified, meaning they have extra iron in them. You will want to purchase formula and bottles before  your baby is born to be sure you are ready after you return from the hospital. The [facility] do not provide formula samples to take home.    Be sure to follow formula mixing directions closely. Regular milk in the dairy case at the grocery store should not be given to babies under 1 year old. Baby formula is sold in several forms including:    Ready-to-use. This is the most expensive, but no mixing is necessary.    Concentrated liquid. This is less expensive than ready-to-use and you mix with water.    Powder. This is the least expensive. You mix one level scoop of powdered formula with two ounces of water and stir well.    Most babies need 2.5 ounces of formula per pound of body weight each day. This means an 8-pound baby may drink about 20 ounces of formula a day; however, this is just an estimate. The most important thing is to pay attention to your baby s cues.  If your baby is always fussy, needs more iron or has certain food allergies, your physician may suggest you change your baby s formula to a different kind.   How do I warm my baby s bottles?  You may feed your baby a bottle without warming it first. It is OK for the breast milk or formula to be cool or room temperature. If your baby seems to prefer it warmed, you can put the filled bottle in a container of warm water and let it stand for a few minutes. Check the temperature of the liquid on your skin before feeding it to your baby; to be sure it isn t too hot. Do not heat bottles in the microwave. Microwaves heat food and liquids unevenly, and this can cause hot spots that can burn your baby.    How do I clean and sterilize bottles?  Sterilize bottles and nipples before you use them for the first time. You can do this by putting them in boiling water for 5 minutes. After that first time, you can wash them in hot and soapy water. Rinse them carefully to be sure there is no soap left on them. You can also wash them in the  rubina.    [facility] [phone] ([phone])

## 2021-05-27 NOTE — PROGRESS NOTES
Zahraa presents to clinic with her two young children.   She reports active fetal movement.  She tells me her body feels uncomfortable and that her baby feels low. She has been stretching for exercise but feels to uncomfortable for exercise.   Reviewed with patient that her weight gain is within normal parameters.   Pregnancy checklist updated.  Her pediatrician is at [facility] and is Dr. Zayas.  Circumcision would be planned if baby were a boy.  Baby is predicted to be a girl.   Reviewed third trimester warning signs and symptoms.  Patient advised to call the midwife on-call in the case that she experiences susu red vaginal bleeding, watery leaking of vaginal fluid, or decreased fetal movement.  Also reviewed signs and symptoms of preeclampsia and recommended patient call in the case that she develops a headache that does not respond to Tylenol, blurred vision/spots/floaters in her vision, or persistent abdominal pain.    Return to clinic in 2 weeks.  Please discuss birth control and birth plan at next visit.     Patient requests a BPP at 36 weeks due to a hx of polyhydramnios.  Explained that if this u/s is not indicated her insurance may not pay for it.  Explained how I decide that a patient should have a BPP.  Showed her in her Epic chart that her fundal height measurements are trending up in a way that is normal and that as long as this continues a BPP would not be indicated for assessment of MARSHA.  Will discuss further at 36 week appointment.

## 2021-05-28 NOTE — PROGRESS NOTES
Zahraa presents to clinic by herself today.  She reports active fetal movement, denies new concerns.  Over the past few days she has been having contractions every 20-30 min lasting for a few hours. She has also been noticing more discharge.   She is curious and desires a cervical exam. 1/40/-3, medium, posterior.    Her constipation has returned.  Encouraged attention to fiber and fresh foods in her diet, 2-3 L of water daily, and exercise on most days. Suggested Miralax if this does not bring about relief.   3rd trimester warning signs and sx reviewed and patient reminded to only call the CNM on call for labor. I asked that she please call prior to coming into the hospital. Return to clinic in 1 week.

## 2021-05-28 NOTE — PATIENT INSTRUCTIONS - HE
Patient Education   HEALTHY PREGNANCY CARE: 37 to 41 WEEKS PREGNANT    Talk with your midwife or physician about when to call with signs of labor    Regular uterine contractions that are getting closer together and/or stronger    If you think your water has broken or is leaking    Bleeding from the vagina like a period (bloody vaginal discharge is normal)    If you are not feeling your baby move    Make plans for transportation and  as needed for when you are going to the hospital.    Your midwife or physician may offer to check your cervix for changes.     Ask your health care provider about vaccinations you may need following delivery. By now, you should have received a Tdap immunization to protect against pertussis or whooping cough. Fathers and family members who will be in close contact with the baby should also receive a Tdap shot at least two weeks before the expected birth of the baby if they have not had a Td (tetanus) shot for at least two years.    If you are past your due date, discuss the next steps leading to delivery with your midwife or physician. If you don't start labor on your own by 41 or 42 weeks, your midwife or physician may recommend giving you medicines to ripen your cervix and start labor.    Preparing for your baby: Tell your midwife or physician how you plan to feed your baby (breast or bottle), who you have chosen to do pediatric care for your baby, and if you have a boy, whether you have chosen to have him circumcised. You will need a car seat correctly installed in your vehicle to bring your baby home. As you start to set up the nursery at home for your baby, make sure the crib is safe. The mattress needs to fit snugly against the edges of the crib. If you can fit a soda can between the bars, they are too far apart and can allow the baby's head to caught between them.    Learn about infant care and feeding, including information about infant CPR. We recommend that you put  your baby to sleep on his or her back to reduce the chance of Sudden Infant Death Syndrome (SIDS). To maintain a healthy environment in which your child can grow, it's best to keep your home smoke-free. By preparing ahead, your transition into parenthood will go smoothly for you and your baby.    Your midwife or physician will want to see you for a checkup 2 to 6 weeks after delivery.    If you have questions about any symptoms you are experiencing or any other concerns, call your provider or their clinic staff at [facility] at Phone: [phone]. If it's after clinic hours, physician patients should call the [facility] at [phone]); midwife patients should call their answering service at [phone].    How can you care for yourself at home?   You can refer to the Starting Out Right book or find it online at [email or web address] or [email or web address]     You can sign up for a weekly parenting e-mail that gives support, tips and advice from health care professionals that starts with pregnancy and continues through the toddler years. To register, go to [email or web address] at any time during your pregnancy.        Making Plans for Feeding My Baby    By this point, you probably have read a lot about feeding your baby.  Breastfeed or formula? Each mother s decision is her own and [facility] respects you and your choices. We ve gathered information on both breastfeeding and formula feeding to help with your decision. Talking with your physician or nurse-midwife can also help in your decision.  However you plan to feed your baby, [facility] encourage rooming in with your baby, skin-to-skin contact and feeding your baby based on his or her cues.    Skin-to-skin contact  Being close to mom helps your baby  adjust to life outside of the womb.  It helps your baby regulate their body temperature, heart rate, and breathing.  Your baby will usually be placed skin-to-skin immediately following birth or as soon as possible, if medical intervention is needed.    Rooming-In  Having your baby stay with you in your room is called  rooming-in .  Keeping your baby in your room helps you to learn how to care for your baby by getting to know your baby s cues, body rhythms and sleep cycle.       Cue-based feeding  Cues (signals) are baby s way of telling you what he or she wants.  When you learn your infant s cues, you know how to care for and feed your baby.   Feeding cues are the licking and smacking of lips, bringing their fist to their mouth, and a reflex called  rooting - where baby turns and opens his or her mouth, searching for the breast or bottle.  Crying is a late feeding cue.  Babies can feed frequently, often at least 8 times in 24 hours.  Breastfeeding facts  Breast milk is the best source of nutrition for your baby and is available at birth. In the first couple of days, your milk volume is already starting to increase, though it may not be noticeable. Breastfeed frequently to increase your milk supply. Within three to five days, you will begin to notice larger milk volumes. An increase in breast size, heaviness and firmness are often described as the milk  coming in.  Frequent breastfeeding can help breasts from getting overly firm and painful. You will know the baby is getting enough milk if your baby is having wet and dirty diapers and gaining weight.     If your goal is to exclusively breastfeed, it is important to not use any formula or artificial nipples (including bottles and pacifiers) while your baby is learning to breastfeed.  While it may seem like an  easy  option to give your baby a bottle, formula should only be given if there is a medical reason for your baby to have it.    Positioning and attachment   Get  comfortable.  Use pillows as needed to support your arms and baby.  Hold baby close at the level of your breast, facing you in a tummy to tummy position.  Skin to skin helps with this.  Position the baby with his or her nose by the nipple.  There should be a straight line from baby s ear to shoulder to hips.  Tickle your baby s lips or wait for baby to open mouth wide, bring baby to breast by leading with the chin.  Aim the nipple at the roof of baby s mouth.  A rapid sucking pattern is followed by longer, drawing pattern with occasional swallows heard.  When baby is correctly latched, your nipple and much of the areola are pulled well into baby s mouth.      Returning to work or school  Focus on a good start to breastfeeding.  Many women continue to provide breastmilk for their baby when they return to work or school.  Making plans about where to pump and store milk can make the transition go well.  Talk with other mothers who have also returned to work or school for tips and support.  Your employer s Human Resource department may be a resource as well.     Returning to work or school: (continued)     babies can mean fewer  sick  days for you.    A quality breast pump will also save time and add comfort.  Check with your insurance prior to giving birth for breast pump coverage.  Many insurance companies include a pump within your benefits.    Wait until your baby is at least three weeks old to introduce a bottle for the first time.  Have someone besides you give the bottle.    Breastfeed when you are with your baby. Reserve your bottles of breast milk for when you are away.     Your breasts will need to be  emptied  either by your baby or a pump.  Plan to pump at least twice in an eight hour day.    If you cannot pump at work, continue breastfeeding at home. Any amount of breast milk is worth giving to your baby.    Formula feeding facts  If you are planning to use formula to feed your baby, you will want  to make some preparations ahead of time. Talk to your doctor or nurse-midwife about what type of formula to use. Some are iron-fortified, meaning they have extra iron in them. You will want to purchase formula and bottles before your baby is born to be sure you are ready after you return from the hospital. [facility] do not provide formula samples to take home.    Be sure to follow formula mixing directions closely. Regular milk in the dairy case at the grocery store should not be given to babies under 1 year old. Baby formula is sold in several forms including:    Ready-to-use. This is the most expensive, but no mixing is necessary.    Concentrated liquid. This is less expensive than ready-to-use and you mix with water.    Powder. This is the least expensive. You mix one level scoop of powdered formula with two ounces of water and stir well.    Most babies need 2.5 ounces of formula per pound of body weight each day. This means an 8-pound baby may drink about 20 ounces of formula a day; however, this is just an estimate. The most important thing is to pay attention to your baby s cues.  If your baby is always fussy, needs more iron or has certain food allergies, your physician may suggest you change your baby s formula to a different kind.   How do I warm my baby s bottles?  You may feed your baby a bottle without warming it first. It is OK for the breast milk or formula to be cool or room temperature. If your baby seems to prefer it warmed, you can put the filled bottle in a container of warm water and let it stand for a few minutes. Check the temperature of the liquid on your skin before feeding it to your baby; to be sure it isn t too hot. Do not heat bottles in the microwave. Microwaves heat food and liquids unevenly, and this can cause hot spots that can burn your baby.    How do I clean and sterilize bottles?  Sterilize bottles and nipples before you use them for the first time. You can do this  by putting them in boiling water for 5 minutes. After that first time, you can wash them in hot and soapy water. Rinse them carefully to be sure there is no soap left on them. You can also wash them in the .    [facility] Connection [phone] ([phone])

## 2021-05-28 NOTE — PATIENT INSTRUCTIONS - HE
Patient Education   HEALTHY PREGNANCY CARE: 37 to 41 WEEKS PREGNANT    Talk with your midwife or physician about when to call with signs of labor    Regular uterine contractions that are getting closer together and/or stronger    If you think your water has broken or is leaking    Bleeding from the vagina like a period (bloody vaginal discharge is normal)    If you are not feeling your baby move    Make plans for transportation and  as needed for when you are going to the hospital.    Your midwife or physician may offer to check your cervix for changes.     Ask your health care provider about vaccinations you may need following delivery. By now, you should have received a Tdap immunization to protect against pertussis or whooping cough. Fathers and family members who will be in close contact with the baby should also receive a Tdap shot at least two weeks before the expected birth of the baby if they have not had a Td (tetanus) shot for at least two years.    If you are past your due date, discuss the next steps leading to delivery with your midwife or physician. If you don't start labor on your own by 41 or 42 weeks, your midwife or physician may recommend giving you medicines to ripen your cervix and start labor.    Preparing for your baby: Tell your midwife or physician how you plan to feed your baby (breast or bottle), who you have chosen to do pediatric care for your baby, and if you have a boy, whether you have chosen to have him circumcised. You will need a car seat correctly installed in your vehicle to bring your baby home. As you start to set up the nursery at home for your baby, make sure the crib is safe. The mattress needs to fit snugly against the edges of the crib. If you can fit a soda can between the bars, they are too far apart and can allow the baby's head to caught between them.    Learn about infant care and feeding, including information about infant CPR. We recommend that you put  your baby to sleep on his or her back to reduce the chance of Sudden Infant Death Syndrome (SIDS). To maintain a healthy environment in which your child can grow, it's best to keep your home smoke-free. By preparing ahead, your transition into parenthood will go smoothly for you and your baby.    Your midwife or physician will want to see you for a checkup 2 to 6 weeks after delivery.    If you have questions about any symptoms you are experiencing or any other concerns, call your provider or their clinic staff at [facility] at Phone: [phone]. If it's after clinic hours, physician patients should call the [facility] at [phone]); midwife patients should call their answering service at [phone].    How can you care for yourself at home?   You can refer to the Starting Out Right book or find it online at [email or web address]/[email or web address][email or web address]-Right.pdf or [email or web address]-starting-out-right/[facility]starting-out-[facility].html#[email or web address]     You can sign up for a weekly parenting e-mail that gives support, tips and advice from health care professionals that starts with pregnancy and continues through the toddler years. To register, go to [email or web address] at any time during your pregnancy.        Making Plans for Feeding My Baby    By this point, you probably have read a lot about feeding your baby.  Breastfeed or formula? Each mother s decision is her own and [facility] respects you and your choices. We ve gathered information on both breastfeeding and formula feeding to help with your decision. Talking with your physician or nurse-midwife can also help in your decision.  However you plan to feed your baby, [facility] encourage rooming in with your baby, skin-to-skin contact and feeding your baby based on his or her cues.    Skin-to-skin contact  Being close to mom helps your baby  adjust to life outside of the womb.  It helps your baby regulate their body temperature, heart rate, and breathing.  Your baby will usually be placed skin-to-skin immediately following birth or as soon as possible, if medical intervention is needed.    Rooming-In  Having your baby stay with you in your room is called  rooming-in .  Keeping your baby in your room helps you to learn how to care for your baby by getting to know your baby s cues, body rhythms and sleep cycle.       Cue-based feeding  Cues (signals) are baby s way of telling you what he or she wants.  When you learn your infant s cues, you know how to care for and feed your baby.   Feeding cues are the licking and smacking of lips, bringing their fist to their mouth, and a reflex called  rooting - where baby turns and opens his or her mouth, searching for the breast or bottle.  Crying is a late feeding cue.  Babies can feed frequently, often at least 8 times in 24 hours.  Breastfeeding facts  Breast milk is the best source of nutrition for your baby and is available at birth. In the first couple of days, your milk volume is already starting to increase, though it may not be noticeable. Breastfeed frequently to increase your milk supply. Within three to five days, you will begin to notice larger milk volumes. An increase in breast size, heaviness and firmness are often described as the milk  coming in.  Frequent breastfeeding can help breasts from getting overly firm and painful. You will know the baby is getting enough milk if your baby is having wet and dirty diapers and gaining weight.     If your goal is to exclusively breastfeed, it is important to not use any formula or artificial nipples (including bottles and pacifiers) while your baby is learning to breastfeed.  While it may seem like an  easy  option to give your baby a bottle, formula should only be given if there is a medical reason for your baby to have it.    Positioning and attachment   Get  comfortable.  Use pillows as needed to support your arms and baby.  Hold baby close at the level of your breast, facing you in a tummy to tummy position.  Skin to skin helps with this.  Position the baby with his or her nose by the nipple.  There should be a straight line from baby s ear to shoulder to hips.  Tickle your baby s lips or wait for baby to open mouth wide, bring baby to breast by leading with the chin.  Aim the nipple at the roof of baby s mouth.  A rapid sucking pattern is followed by longer, drawing pattern with occasional swallows heard.  When baby is correctly latched, your nipple and much of the areola are pulled well into baby s mouth.      Returning to work or school  Focus on a good start to breastfeeding.  Many women continue to provide breastmilk for their baby when they return to work or school.  Making plans about where to pump and store milk can make the transition go well.  Talk with other mothers who have also returned to work or school for tips and support.  Your employer s Human Resource department may be a resource as well.     Returning to work or school: (continued)     babies can mean fewer  sick  days for you.    A quality breast pump will also save time and add comfort.  Check with your insurance prior to giving birth for breast pump coverage.  Many insurance companies include a pump within your benefits.    Wait until your baby is at least three weeks old to introduce a bottle for the first time.  Have someone besides you give the bottle.    Breastfeed when you are with your baby. Reserve your bottles of breast milk for when you are away.     Your breasts will need to be  emptied  either by your baby or a pump.  Plan to pump at least twice in an eight hour day.    If you cannot pump at work, continue breastfeeding at home. Any amount of breast milk is worth giving to your baby.    Formula feeding facts  If you are planning to use formula to feed your baby, you will want  to make some preparations ahead of time. Talk to your doctor or nurse-midwife about what type of formula to use. Some are iron-fortified, meaning they have extra iron in them. You will want to purchase formula and bottles before your baby is born to be sure you are ready after you return from the hospital. [facility] do not provide formula samples to take home.    Be sure to follow formula mixing directions closely. Regular milk in the dairy case at the grocery store should not be given to babies under 1 year old. Baby formula is sold in several forms including:    Ready-to-use. This is the most expensive, but no mixing is necessary.    Concentrated liquid. This is less expensive than ready-to-use and you mix with water.    Powder. This is the least expensive. You mix one level scoop of powdered formula with two ounces of water and stir well.    Most babies need 2.5 ounces of formula per pound of body weight each day. This means an 8-pound baby may drink about 20 ounces of formula a day; however, this is just an estimate. The most important thing is to pay attention to your baby s cues.  If your baby is always fussy, needs more iron or has certain food allergies, your physician may suggest you change your baby s formula to a different kind.   How do I warm my baby s bottles?  You may feed your baby a bottle without warming it first. It is OK for the breast milk or formula to be cool or room temperature. If your baby seems to prefer it warmed, you can put the filled bottle in a container of warm water and let it stand for a few minutes. Check the temperature of the liquid on your skin before feeding it to your baby; to be sure it isn t too hot. Do not heat bottles in the microwave. Microwaves heat food and liquids unevenly, and this can cause hot spots that can burn your baby.    How do I clean and sterilize bottles?  Sterilize bottles and nipples before you use them for the first time. You can do this  by putting them in boiling water for 5 minutes. After that first time, you can wash them in hot and soapy water. Rinse them carefully to be sure there is no soap left on them. You can also wash them in the .    [facility] Connection [phone] ([phone])

## 2021-05-28 NOTE — PROGRESS NOTES
today.  Value not accepted in to V/S flow sheet cell.  Zahraa presents to clinic with her two young children.  She reports active fetal movement, denies concerns.  SHe had contractions yesterday that were regular, q 10 minutes.  They started after having intercourse and then they resolved over time.Discussed that having periods of time with contractions in the weeks or days prior is normal. Encouraged food, water, and rest to see if these decrease them. Discussed the importance of rest rather than activity if contractions come and are mild and spaced out.  Zahraa declines cervical exam today.  GBS positive.  She accepts antibx prophylaxis.  Agrees to call in earlier stage of labor or if her membranes rupture and intends to come in to ensure coverage.   Weight gain continues to be within recommended limits.  Pregnancy checklist updated. Zahraa plans to accept routine postpartum  medications and vaccines.  3rd trimester warning signs and sx reviewed and patient reminded to only call the CNM on call for labor. I asked that she please call prior to coming into the hospital. Return to clinic in 1 week.

## 2021-05-28 NOTE — PROGRESS NOTES
"Zahraa presents to clinic by herself today.   She reports active fetal movement, denies concerns.  She reports occasional [place] contractions some of which are very strong.  She tells me she is concerned that when she goes into labor she will not recognize it because she has been induced with her previous 2 pregnancies.  Reviewed signs and symptoms of typical labor and encouraged her to trust herself that she will know when something changes.  Provided reassurance that she can call the midwife on call in the case that she has regular cramping but is not quite sure what to do.  Also recommended she call the midwife on call in the case that she thinks her water has broken, she has bright red vaginal bleeding, or she does not feel her baby moving as much or at all when her baby would otherwise be active.  Also reviewed signs and symptoms of preeclampsia and encouraged her to call with upper abdominal pain, headache that does not go away with Tylenol, and visual changes.  Patient has been drinking red raspberry leaf tea to prepare her body for labor.  Discussed and encouraged staying active and relaxing in these final weeks.  Patient asks if she can catch her own baby.  Discussed that the midwife will manage the head and ensure that the shoulder comes out and then will assist her to reach down and guide her baby to her abdomen if she would like.   Reviewed weight gain, weight gain is within recommended limits.   Patient reports her itching is much better and not very noticeable.  She states \"I think I just have dry skin\".  Encouraged oatmeal baths and moisturizers.  Patient also tells me her constipation is better and thinks it was just something that she had eaten.  Patient tells me she already has a Spectra breast pump.  Had a lengthy discussion about contraception.  Patient reported her  was planning to have a vasectomy but as of today he has not made an appointment for a consultation.  Patient plans to " "use progesterone only pills as \"gap contraception\".  Discussed with patient that this is not as effective a method as using a LARC.  Reviewed IUDs and Nexplanon and encouraged her to consider.  Return to clinic in 1 week.     "

## 2021-05-28 NOTE — PROGRESS NOTES
"Zahraa presents to clinic with her mother and 2 children today.  She reports active fetal movement.  Reviewed patient's previous concern about itchy palms and feet as well as her labs which were normal (bile acids, AST, ALT).  Patient tells me her itching has decreased and that she only feels it 1-2 times a week and it does not bother her very much or keep her awake.  I recommended she come into the clinic in the case that her itching becomes more pronounced as we will likely repeat labs.    She has been experiencing constipation and took Dulcolax yesterday and had a small bowel movement this morning.  I recommended against taking a stimulant laxative and recommended MiraLAX instead.  Also discussed drinking 2 L of water daily, eating 4-5 servings of vegetables, adding prunes to her diet, and exercising.  Patient also reports pain over the area of her pubic symphysis.  She tells me that it cracks in the morning but that this is not painful.  If she is able to walk and move about normally.  The pain resolves shortly after getting out of bed.  She also tells me that the left side of her tailbone \"feels out of place\".  She tells me she has felt this in all of her pregnancies and the discomfort continues.  Chiropractic recommendation given.    Weight gain continues to be just above recommended limit.  Weight gain is consistent.  Encouraged activity and healthful eating.    Pregnancy checklist updated    Vertex fetal lie assessed on Leopold's exam.  Fetus confirmed to be in a cephalic presentation by bedside ultrasound.  Revisited the topic of her desire for a biophysical profile given her previous experience of polyhydramnios and fetal malposition.  Discussed with patient that a BPP is not indicated at this time.  Patient in agreement and does not desire a biophysical profile due to cephalic presentation and consistent/normal fundal height measurement.    Reviewed third trimester warning signs and symptoms.  Patient " advised to call the midwife on-call in the case that she experiences susu red vaginal bleeding, watery leaking of vaginal fluid, or decreased fetal movement.  Also reviewed signs and symptoms of preeclampsia and recommended patient call in the case that she develops a headache that does not respond to Tylenol, blurred vision/spots/floaters in her vision, or persistent abdominal pain.    GBS and hgb done today.  Return to clinic in 1 week.

## 2021-05-29 NOTE — PROGRESS NOTES
"     2-week postpartum visit    Assessment:        Zahraa Caicedo is a 29 y.o. female here for postpartum follow up at 2 weeks postpartum.   Plan:     1.  Consider referral for pelvic floor physical therapy at 6-week postpartum visit.  2.  Return to clinic in 4 weeks for 6-week postpartum visit or sooner as needed.    Subjective:       Zahraa Caicedo is a 29 y.o. female who presents for a postpartum follow up visit. She is 2 weeks postpartum following a spontaneous vaginal delivery.  She brings in her baby girl Alfa.  I have fully reviewed the prenatal and intrapartum course. The delivery was at 40 weeks 5 days. Outcome: spontaneous vaginal delivery. The amount and color of the lochia is appropriate for the duration of recovery. The patient feels well. The baby is well and being fed by breast. Voiding without difficulty, lochia normal, tolerating normal diet, and passing flatus.  Pain is well controlled with current medications.  The patient has no emotional concerns and tells me \"I have felt the best after this 1\".  Her EPDS today is 1, 0 on #10.  She tells me she feels weepy at night but does not really feel sad.  Reviewed signs and symptoms of postpartum depression and encouraged her to call the midwife on call in the case that she has thoughts of harming herself or her baby and to make an appointment for sooner than 6 weeks if she feels her mood is either depressed or anxious and interfering with her day-to-day life.    Her partner has some time off following the birth and will be home until July 1.  She tells me they have been sharing night parenting duties though she feels she does more of the work getting up with the baby.     She feels her family is complete and plans to have a Nexplanon inserted.  She has not yet had intercourse and I encouraged her to use a condom with each act or abstain until her Nexplanon is inserted.      Patient tells me she experienced urgency and stress incontinence after her " first 2 births but she has not experienced that since her birth 2 weeks ago.  She does have intermittent sharp pubic pain when she rolls over in bed.  This is not consistent that she cannot reproduce the pain.  Discussed pelvic floor physical therapy.  She will consider.  Discussed pelvic floor exercises including Kegel's.  Encouraged her to perform 10 with each feeding with a goal of 50 to 100/day.      Patient tells me she has started being more physically active and has been doing some walking and stretching.    She tells me breast-feeding is going well and that she has a fast letdown and her baby is gaining weight.  She does not have any issues with latching or breast pain.      The following portions of the patient's history were reviewed and updated as appropriate: allergies, current medications and problem list    Review of Systems  General:  Denies problem  Eyes: Denies problem  Ears/Nose/Throat: Denies problem  Cardiovascular: Denies problem  Respiratory:  Denies problem  Gastrointestinal:  Denies problem, Genitourinary: Denies problem  Musculoskeletal:  Denies problem  Skin: Denies problem  Neurologic: Denies problem  Psychiatric: Denies problem  Endocrine: Denies problem  Heme/Lymphatic: Denies problem   Allergic/Immunologic: Denies problem          Objective:         Vitals:    06/13/19 1211   BP: 126/74   Pulse: 65   SpO2: 99%   Weight: 174 lb (78.9 kg)       Physical Exam:  No exam performed today, counseling only 20 min.    General Appearance: Alert, cooperative, no distress, appears stated age

## 2021-05-29 NOTE — PATIENT INSTRUCTIONS - HE
[facility] Nurse Midwives - Contact information:  Appointment line and to get a hold of CNM in clinic Monday-Friday 8 am - 5 pm:  [phone].  There are some clinics with early start times (1st appointment 7:40 am) and others with evening hours (last appointment 6:20 pm).  Most are typically open from 8 am to 5 pm.    CNM on call answering service: [phone].  Specify your hospital of choice and leave a brief message for CNM;  will then page CNM who is on call at your specified hospital and you should receive a call back with 15 minutes.  Be sure that your ringer is audible and that you can accept blocked calls so that we can get back in touch with you! This number should be reserved for urgent needs if during the day, before 8 am, after 5 pm, weekends, holidays.    Contact the on-call CNM with warning signs, such as:    vaginal bleeding     Vaginal discharge and itching or pain and burning during urination    Leg/calf pain or swelling on one side    severe abdominal pain    nausea and vomiting more than 4-5 times a day, or if you are unable to keep anything down    fever more than 100.4 degrees F.         You are invited to  Meet the [facility] Nurse-Midwives  A way to tour the hospital Labor and Delivery unit and meet the midwives that attend births since you may not have the opportunity to meet them during your prenatal care.  Some sessions are informal meet and greet type social hours, others address a specific concern or topic.    Thursday, Februrary 22, 2018 7-8pm  [facility]  Social Hour    Thursday, April 19, 2018 7-8pm  [facility]  Exercise, nutrition and weight gain    Tuesday, June 28, 2018 7-8pm  [facility]  Postpartum depression/anxiety    Thursday August 23, 2018 7-8 pm  [facility], [facility]  Physiology of birth and breastfeeding, Pediatrician presentation    Thursday October 18,  2018  7-8pm,  [facility], [facility]  Social Hour    Please call [phone] to register        Touring the Maternity Care Center  To schedule a tour at either [facility] or [facility], please do so online using the following links:  [facility] - [email or web address]  [facility] - [email or web address]    Childbirth and Parenting Education:   [facility]: http://[facility]/   [phone]  [place]: (education, yoga & wellness) www.Razor Insights  Enlightened Mama: www.[facility].RetailerSaver.com   Childbirth collective: (Parent topic nights)  www.childbirthcollective.org/  Hypnobabies:  www.hypnobabiestwincities.com/  Hypnobirthing:  Http://hypnobirthing.com/    Book Recommendations:   Lalita Charles's Birthing From Within--first few chapters include a new-age tone, you may prefer to skip it and keep going, because there is good stuff later.  This book recommendation covers emotional preparation, but does cover coping with pain, and use of both pharmacological and nonpharmacological methods.    Dr. Duque' The Pregnancy Book and The Birth Book--the pregnancy book goes month-by month    Womanly Art of Breastfeeding by La Leche League International   Bestfeeding by Sylvia Purdy--great pictures    Mothering Your Nursing Toddler, by Molly Ding.   Addresses dealing with so many of the challenging behaviors of a nursing toddler.  How Weaning Happens, by La Leche League.  Discusses weaning at all ages, from medically necessary weaning of an infant, all the way up to age 5 (or older), with why/why not, and strategies.  Very empowering book both for deciding to wean and deciding not to.    American College of Nurse-Midwives (ACNM) http://www.midwife.org/; look at the informational handouts at  "http://www.midwife.org/Share-With-Women     www.mymidwife.org    Mother to Baby (Medication and Herbal guidance in pregnancy): http://www.mothertobaby.org  Toll-Free Hotline: 268.547.5451  LactMed (Medication use while breastfeeding): http://toxnet.nlm.nih.gov/newtoxnet/lactmed.htm    Women's Health.gov:  http://www.womenshealth.gov/a-z-topics/index.html    American pregnancy association - http://americanpregnancy.org    Centering Pregnancy (group prenatal care option): http://centeringhealthcare.org    Information about doulas:  Childbirth collective: http://www.childbirthcollective.org/  Doulas of [place] (BRENDA):  www.brenda.org  Workbooks [facility]  project: http://Geckocitiesdoulaproject.com/     Early Childhood and Family Education (ECFE):  ECFE offers parents hands-on learning experiences that will nourish a lifetime of teachable moments.  http://ecfe.info/ecfe-home/     Dim www.marchofTriPlaymes.com     FDA - Nutrition  www.mypyramid.gov  Under \"For Consumers,\" click on \"pregnant and breastfeeding women.\"      Centers for Disease Control and Prevention (CDC) - Vaccines : http://www.cdc.gov/vaccines/       When researching information on the web, question the validity of websites.  The MaxTradeIn.com .gov, .edu and.org tend to be more reliable information.  If there are a lot of advertisements, be cautious of the information provided. Stay away from blogs and chat rooms please!     [place] Screening Program  Http://www.health.[facility].mn.us/newbornscreening/  [place] newborns are tested soon after birth for more than 50 hidden, rare disorders, including hearing loss and critical congenital heart disease (CCHD). This site provides resources and information for families and providers.    HEALTHY PREGNANCY CARE: 37 to 41 WEEKS PREGNANT    Talk with your midwife or physician about when to call with signs of labor    Regular uterine contractions that are getting closer together and/or stronger    If you think your " water has broken or is leaking    Bleeding from the vagina like a period (bloody vaginal discharge is normal)    If you are not feeling your baby move    Make plans for transportation and  as needed for when you are going to the hospital.    Your midwife or physician may offer to check your cervix for changes.     Ask your health care provider about vaccinations you may need following delivery. By now, you should have received a Tdap immunization to protect against pertussis or whooping cough. Fathers and family members who will be in close contact with the baby should also receive a Tdap shot at least two weeks before the expected birth of the baby if they have not had a Td (tetanus) shot for at least two years.    If you are past your due date, discuss the next steps leading to delivery with your midwife or physician. If you don't start labor on your own by 41 or 42 weeks, your midwife or physician may recommend giving you medicines to ripen your cervix and start labor.  Induction of labor: http://onlinelibrary.cortez.com/store/10.1016/j.jmwh.2008.04.018/asset/j.jmwh.2008.04.018.pdf?v=1&t=gjtv9zco&k=[email or web address]    Preparing for your baby: Tell your midwife or physician how you plan to feed your baby (breast or bottle), who you have chosen to do pediatric care for your baby, and if you have a boy, whether you have chosen to have him circumcised. You will need a car seat correctly installed in your vehicle to bring your baby home. As you start to set up the nursery at home for your baby, make sure the crib is safe. The mattress needs to fit snugly against the edges of the crib. If you can fit a soda can between the bars, they are too far apart and can allow the baby's head to caught between them.    Learn about infant care and feeding, including information about infant CPR. We recommend that you put your baby to sleep on his or her back to reduce the chance of Sudden Infant  Death Syndrome (SIDS). To maintain a healthy environment in which your child can grow, it's best to keep your home smoke-free. By preparing ahead, your transition into parenthood will go smoothly for you and your baby.    Your midwife or physician will want to see you for a checkup 2 to 6 weeks after delivery.    If you have questions about any symptoms you are experiencing or any other concerns, call your provider or their clinic staff at [facility] at Phone: [phone]. If it's after clinic hours, physician patients should call the [facility] at [phone] ([phone]); midwife patients should call their answering service at [phone].    How can you care for yourself at home?   You can refer to the Starting Out Right book or find it online at http://www.healthProvade.org/images/stories/maternity/[facility]-Starting-Out-Right.pdf or http://www.[facility].org/images/stories/flipbooks/[facility]-starting-out-right/[facility]-starting-out-right.html#p=8     You can sign up for a weekly parenting e-mail that gives support, tips and advice from health care professionals that starts with pregnancy and continues through the toddler years. To register, go to www.[facility].org/baby at any time during your pregnancy.        Making Plans for Feeding My Baby    By this point, you probably have read a lot about feeding your baby.  Breastfeed or formula? Each mother s decision is her own and [facility] respects you and your choices. We ve gathered information on both breastfeeding and formula feeding to help with your decision. Talking with your physician or nurse-midwife can also help in your decision.  However you plan to feed your baby, [facility] encourage rooming in with your baby, skin-to-skin contact and feeding your baby based on his or her cues.    Skin-to-skin contact  Being close to mom helps your baby adjust to life outside of the womb.  It helps your baby regulate their body  temperature, heart rate, and breathing.  Your baby will usually be placed skin-to-skin immediately following birth or as soon as possible, if medical intervention is needed.    Rooming-In  Having your baby stay with you in your room is called  rooming-in .  Keeping your baby in your room helps you to learn how to care for your baby by getting to know your baby s cues, body rhythms and sleep cycle.       Cue-based feeding  Cues (signals) are baby s way of telling you what he or she wants.  When you learn your infant s cues, you know how to care for and feed your baby.   Feeding cues are the licking and smacking of lips, bringing their fist to their mouth, and a reflex called  rooting - where baby turns and opens his or her mouth, searching for the breast or bottle.  Crying is a late feeding cue.  Babies can feed frequently, often at least 8 times in 24 hours.    Breastfeeding facts  Breast milk is the best source of nutrition for your baby and is available at birth. In the first couple of days, your milk volume is already starting to increase, though it may not be noticeable. Breastfeed frequently to increase your milk supply. Within three to five days, you will begin to notice larger milk volumes. An increase in breast size, heaviness and firmness are often described as the milk  coming in.  Frequent breastfeeding can help breasts from getting overly firm and painful. You will know the baby is getting enough milk if your baby is having wet and dirty diapers and gaining weight.     If your goal is to exclusively breastfeed, it is important to not use any formula or artificial nipples (including bottles and pacifiers) while your baby is learning to breastfeed.  While it may seem like an  easy  option to give your baby a bottle, formula should only be given if there is a medical reason for your baby to have it.      Positioning and attachment   Get comfortable.  Use pillows as needed to support your arms and baby.   Hold baby close at the level of your breast, facing you in a tummy to tummy position.  Skin to skin helps with this.  Position the baby with his or her nose by the nipple.  There should be a straight line from baby s ear to shoulder to hips.  Tickle your baby s lips or wait for baby to open mouth wide, bring baby to breast by leading with the chin.  Aim the nipple at the roof of baby s mouth.  A rapid sucking pattern is followed by longer, drawing pattern with occasional swallows heard.  When baby is correctly latched, your nipple and much of the areola are pulled well into baby s mouth.      Returning to work or school  Focus on a good start to breastfeeding.  Many women continue to provide breastmilk for their baby when they return to work or school.  Making plans about where to pump and store milk can make the transition go well.  Talk with other mothers who have also returned to work or school for tips and support.  Your employer s Human Resource department may be a resource as well.     Returning to work or school: (continued)     babies can mean fewer  sick  days for you.    A quality breast pump will also save time and add comfort.  Check with your insurance prior to giving birth for breast pump coverage.  Many insurance companies include a pump within your benefits.    Wait until your baby is at least three weeks old to introduce a bottle for the first time.  Have someone besides you give the bottle.    Breastfeed when you are with your baby. Reserve your bottles of breast milk for when you are away.     Your breasts will need to be  emptied  either by your baby or a pump.  Plan to pump at least twice in an eight hour day.    If you cannot pump at work, continue breastfeeding at home. Any amount of breast milk is worth giving to your baby.    Formula feeding facts  If you are planning to use formula to feed your baby, you will want to make some preparations ahead of time. Talk to your doctor or  nurse-midwife about what type of formula to use. Some are iron-fortified, meaning they have extra iron in them. You will want to purchase formula and bottles before your baby is born to be sure you are ready after you return from the hospital. The [facility] do not provide formula samples to take home.    Be sure to follow formula mixing directions closely. Regular milk in the dairy case at the grocery store should not be given to babies under 1 year old. Baby formula is sold in several forms including:    Ready-to-use. This is the most expensive, but no mixing is necessary.    Concentrated liquid. This is less expensive than ready-to-use and you mix with water.    Powder. This is the least expensive. You mix one level scoop of powdered formula with two ounces of water and stir well.    Most babies need 2.5 ounces of formula per pound of body weight each day. This means an 8-pound baby may drink about 20 ounces of formula a day; however, this is just an estimate. The most important thing is to pay attention to your baby s cues.  If your baby is always fussy, needs more iron or has certain food allergies, your physician may suggest you change your baby s formula to a different kind.     How do I warm my baby s bottles?  You may feed your baby a bottle without warming it first. It is OK for the breast milk or formula to be cool or room temperature. If your baby seems to prefer it warmed, you can put the filled bottle in a container of warm water and let it stand for a few minutes. Check the temperature of the liquid on your skin before feeding it to your baby; to be sure it isn t too hot. Do not heat bottles in the microwave. Microwaves heat food and liquids unevenly, and this can cause hot spots that can burn your baby.    How do I clean and sterilize bottles?  Sterilize bottles and nipples before you use them for the first time. You can do this by putting them in boiling water for 5 minutes. After that  first time, you can wash them in hot and soapy water. Rinse them carefully to be sure there is no soap left on them. You can also wash them in the .    [facility] [phone] ([phone])

## 2021-05-29 NOTE — TELEPHONE ENCOUNTER
:   N/A    Language:   English    Discharge Follow-Up:  Follow-Up call by Outreach nurse: Call placed    Type of Delivery:      Feeding Method:  Breastfeeding    Feedings in 24Hrs:  >8x/Day    Breast engorgement:   Yes    Nipple tenderness:   Yes    Non-nursing engorgement discussed:   N/A    Amount of Feeding:  None    Number of Infant Stools in 24 hours:   >3    Stool:  Transition    Number of Infant voids in 24 hours:  >3    Urine:  Clear    Infant Jaundice:   None    Discussed increasing s/s of Jaundice:   Yes    Circumcision:   N/A    Maternal s/s of infection:   None    Maternal s/s of PIH:   WDL    Postpartum cramping:   Mild    Comfort:  Adequate with routine pain meds    Vaginal Bleeding:  WDL    S/S of Maternal Thrombosis:  None    Maternal BM Since Delivery:  Yes    Referrals:   None    Resources Used During Phone Call:  HEPS    Comments:   Spoke with mom and she states things are going well overall. NB was in to see provider and was back to birth wt. Voiding/stooling. No concerns noted at Dr. No jaundice. Mom denies concerns with herself. Edu discussed.     Completed by:   Maggie Burkett RN

## 2021-05-30 VITALS — HEIGHT: 64 IN | BODY MASS INDEX: 30.56 KG/M2 | WEIGHT: 179 LBS

## 2021-05-30 VITALS — HEIGHT: 64 IN | WEIGHT: 158.6 LBS | BODY MASS INDEX: 27.08 KG/M2

## 2021-05-30 VITALS — BODY MASS INDEX: 26.79 KG/M2 | WEIGHT: 156.9 LBS | HEIGHT: 64 IN

## 2021-05-30 VITALS — HEIGHT: 64 IN | BODY MASS INDEX: 30.68 KG/M2 | WEIGHT: 179.7 LBS

## 2021-05-30 VITALS — WEIGHT: 170.9 LBS | HEIGHT: 64 IN | BODY MASS INDEX: 29.18 KG/M2

## 2021-05-30 VITALS — BODY MASS INDEX: 28.13 KG/M2 | HEIGHT: 64 IN | WEIGHT: 164.8 LBS

## 2021-05-30 NOTE — TELEPHONE ENCOUNTER
"Telephone encounter:  I called and spoke with Zahraa regarding her concern of tenderness and pain related to arm positioning in regards to her recently placed Nexplanon.  Patient tells me that pain is improving as of the last few days but she continues to have \"nerve pain\" when moving her arm in certain positions.  I let her know that the Nexplanon can be removed and replaced if she would like.  She tells me she is going to give it another week or so but that she may go ahead with this option.  She tells me she is otherwise doing well.    Felipe Yo, REYES,CNM    "

## 2021-05-30 NOTE — PROGRESS NOTES
CARLA@Patient ID: Zahraa Caicedo is a 29 y.o. female.  Assessment:   29 y.o  at 6 weeks postpartum,  at 40w 5d  Feeding:Breast  Contraception:Nexplanon inserted today  EPDS:6, 0 on # 10  Last pap:10/22/18, negative      Plan:    1. Pap smear 10/22/18, negative, HPV not performed per ASCCP guidelines.  Next Pap due 10/2021.   2. Desired contraception: Nexplanon insertion today   3. Discussed resumption of exercise and setting a goal to return to pre-pregnancy weight in the next 6-12 months.   4.  Resumption of intercourse reviewed with possible changes in libido and vaginal lubrication while nursing.  4.  Nutrition and supplements reviewed.  Advised continuation of a prenatal or multivitamin, also Vitamin D3 6695-1318 IU geltab daily and an omega 3 fatty acid supplement.  5. Adjustment to parenting, self care and open communication with her support system discussed. Warning signs and symptoms related to postpartum mood disorders reviewed.   6.Return to clinic in 1 year for annual well woman exam or sooner as needed.    Total time spent with patient 45 minutes, >50% counseling, education and coordination of care.    Subjective:     Zahraa Caicedo is a 29 y.o. female who presents for postpartum visit. She is 6 weeks postpartum following a NSVB.  I have fully reviewed the prenatal and intrapartum course. The delivery was at 40w 5d. Her baby girl is named Alfa and weighed 7 lbs 3 oz at birth.     She reports her birth experience was fast and not too difficult.  She is pleased that she did not have a tear.  She tells me things at home have been difficult with 3 children under age 4, especially since her  went back to work 2 weeks ago.  She has support from family but not around the clock.     Discussed additional support from a counselor.  She is apprehensive and tells me she doesn't like to share what's going on for her.  She states she talks to her .  Discussed the value of  "working with a professional and that the relationship can be limited or ongoing per her preference.  Provided resources for counseling and the [place] [facility].     [place] postpartum depression screening score: 6, 0 on # 10    Patient denies a history of depression and/or postpartum depression.  Reviewed signs and sx of PPD and that this may occur at anytime up to 1 year postpartum.  I encouraged the patient to call the office for an appointment if she feels she is experiencing worsening mood changes concerning for PPD.  Discussed that if her sx are non-urgent to call the office and that if her symptoms are urgent(i.e.she has thoughts of harming herself or others) to call the CNM on call for immediate help.    Baby's course has been stable. Baby is feeding by breast.  She sleeps up to 5 hours at a time.    Lochia ceased at 3 weeks postpartum.  It had stopped at 2 weeks PP, then restarted lightly.      Bowel function is normal. Bladder function is normal.     She has not resumed intercourse. She is concerned with unplanned pregnancy.      Desired contraception: Nexplanon insert today.  Her partner plans a vasectomy this year.     She has resumed regular exercise.  She uses the \"Beach Body\" exercise pebbles for guidance.       Review of Systems  General:  Denies problem  Eyes: Denies problem  Ears/Nose/Throat: Denies problem  Cardiovascular: Denies problem  Respiratory:  Denies problem  Gastrointestinal:  Denies problem, Genitourinary: Denies problem  Musculoskeletal:  Denies problem  Skin: Denies problem  Neurologic: Denies problem  Psychiatric: Denies Problem  Endocrine: Denies problem    Objective:         Physical Exam:  General Appearance: Alert, cooperative, no distress, appears stated age  Skin: Skin color, texture, turgor normal, no rashes or lesions  Throat: Lips, mucosa, and tongue normal; teeth and gums normal  HEENT: grossly normal; otoscopic and opthalmic exam not performed.   Neck: Supple, symmetrical, trachea " midline, no adenopathy;  thyroid: not enlarged, symmetric, no tenderness/mass/nodules  Lungs: Clear to auscultation bilaterally, respirations unlabored  Breasts: No breast masses, tenderness, asymmetry, or nipple discharge.  Heart: Regular rate and rhythm, S1 and S2 normal, no murmur, rub, or gallop  Abdomen: Soft, non-tender.   Pelvic:External genitalia normal without lesions or irritation. Vagina and cervix show no lesions, inflammation, discharge or tenderness.  No cystocele, No rectocele. Uterus fully involuted.  No adnexal mass or tenderness.   Extremities: Extremities normal without varicosities or edema       Last Pap:10/22/18,  results were NILM.  Immunization History   Administered Date(s) Administered     Influenza, inj, historic,unspecified 11/18/2015     Influenza,seasonal quad, PF, 36+MOS 12/12/2016, 10/09/2017, 12/10/2018     Tdap 04/17/2017, 03/18/2019     Immunization status: up to date and documented      Nexplanon Insertion Procedure Note    Pre-operative Diagnosis: desires Nexplanon for contraception    Post-operative Diagnosis: same    Indications: contraception    Procedure Details   Urine pregnancy test was done and result was negative.     NEXPLANON has been discussed with healthcare provider who answered all   questions. It was explained that there are benefits as well as risks with using NEXPLANON. The patient understands that there are other birth control methods and that each has its own benefits and risks.  She also understands that she needs to sign a consent form to show  that she is making an  informed and careful decision to use NEXPLANON, and that she has read and understood the following points.    NEXPLANON helps to keep her from getting pregnant.    No contraceptive method is 100% effective, including NEXPLANON.    NEXPLANON has an implant that contains a hormone.    It is important to have the NEXPLANON implant placed in the arm at the right time ofthe menstrual cycle.    After  the implant is placed in my arm, the patient should check that it is in place by gently pressing her fingertips over the skin where the implant was placed. She should be able to feel the implant.    The implant must be removed at the end of three years. The implant can be removed sooner if  she wants it removed.    If she has trouble finding a healthcare provider to remove the implant, she can call [phone] for help.    The implant is placed under the skin of the arm during a procedure done in a healthcare provider s office. There is a slight risk of getting a scar or an infection from this procedure.    Removal is usually a minor procedure. Sometimes, removal may be more difficult. Special procedures, including surgery in the hospital, may be needed. Difficult removals may cause pain and scarring and may result in injury to nerves and blood vessels. If the implant is not removed, its effects may continue.    Most women have changes in their menstrual bleeding patterns while using NEXPLANON.  Bleeding may be irregular, lighter or heavier, or bleeding may completely stop. If the patient thinks she is pregnant, she should contact my healthcare provider as soon as possible.    the patient verbalizes her understanding of  the warning signs for problems with NEXPLANON. She has been advised to seek medical attention if any warning signs appear.    She should tell all her healthcare providers that she is using NEXPLANON.    The patient was advised to have a medical checkup regularly and at any time she is having problems.    NEXPLANON does not provide protection from HIV infection (AIDS) or any other sexually transmitted diseases.  After learning about NEXPLANON, the patient chose to use NEXPLANON.     The risks (including infection, bleeding, pain) and benefits of the procedure were explained to the patient and written informed consent was obtained.      Pt was positioned on exam table with left, non dominant arm  next to her on the table. Insertion site was cleaned with betadine prep at 8cm from elbow crease. 3 mls of 1%Lidocaine inserted intradermally and Nexplanon was inserted without difficulty. Site was covered with band aid and a pressure dressing. Pt instructed not to lift heavy items with left arm for 24 hours, Ibuprofen prn for pain or ice to site as need for bruising    Nexplanon Information:  Lot # [id number]  Exp date: 11/21/21.    Condition:  Stable    Complications:  None    Plan:    The patient was advised to call for any fever or for prolonged or severe pain or bleeding. She was advised to use OTC ibuprofen as needed for mild to moderate pain.       Total face to face time with patient 45 minutes with >50% spent on counseling/coordination of care.    REYES Ricketts,CURT

## 2021-05-31 VITALS — HEIGHT: 64 IN | WEIGHT: 190.1 LBS | BODY MASS INDEX: 32.46 KG/M2

## 2021-05-31 VITALS — HEIGHT: 66 IN | BODY MASS INDEX: 27.16 KG/M2 | WEIGHT: 169 LBS

## 2021-05-31 VITALS — BODY MASS INDEX: 31.76 KG/M2 | WEIGHT: 186 LBS | HEIGHT: 64 IN

## 2021-05-31 VITALS — BODY MASS INDEX: 32.13 KG/M2 | WEIGHT: 188.2 LBS | HEIGHT: 64 IN

## 2021-05-31 VITALS — WEIGHT: 183 LBS | HEIGHT: 64 IN | BODY MASS INDEX: 31.24 KG/M2

## 2021-05-31 VITALS — BODY MASS INDEX: 26.39 KG/M2 | WEIGHT: 164.2 LBS | HEIGHT: 66 IN

## 2021-05-31 VITALS — HEIGHT: 64 IN | WEIGHT: 163 LBS | BODY MASS INDEX: 27.83 KG/M2

## 2021-05-31 VITALS — HEIGHT: 64 IN | BODY MASS INDEX: 32.1 KG/M2 | WEIGHT: 188 LBS

## 2021-05-31 VITALS — BODY MASS INDEX: 32.21 KG/M2 | WEIGHT: 188.7 LBS | HEIGHT: 64 IN

## 2021-05-31 VITALS — WEIGHT: 169.4 LBS | HEIGHT: 66 IN | BODY MASS INDEX: 27.23 KG/M2

## 2021-06-02 VITALS — BODY MASS INDEX: 29.54 KG/M2 | WEIGHT: 183 LBS

## 2021-06-02 VITALS — BODY MASS INDEX: 29.86 KG/M2 | WEIGHT: 185 LBS

## 2021-06-02 VITALS — HEIGHT: 66 IN | WEIGHT: 187.6 LBS | BODY MASS INDEX: 30.15 KG/M2

## 2021-06-02 VITALS — HEIGHT: 66 IN | BODY MASS INDEX: 29.25 KG/M2 | WEIGHT: 182 LBS

## 2021-06-02 VITALS — BODY MASS INDEX: 30.5 KG/M2 | WEIGHT: 189.8 LBS | HEIGHT: 66 IN

## 2021-06-02 VITALS — WEIGHT: 154.9 LBS | HEIGHT: 66 IN | BODY MASS INDEX: 24.89 KG/M2

## 2021-06-02 VITALS — BODY MASS INDEX: 28.28 KG/M2 | HEIGHT: 66 IN | WEIGHT: 176 LBS

## 2021-06-02 VITALS — WEIGHT: 157 LBS | HEIGHT: 66 IN | BODY MASS INDEX: 25.23 KG/M2

## 2021-06-02 VITALS — BODY MASS INDEX: 29.7 KG/M2 | WEIGHT: 184 LBS

## 2021-06-02 VITALS — WEIGHT: 163.2 LBS | BODY MASS INDEX: 26.23 KG/M2 | HEIGHT: 66 IN

## 2021-06-02 VITALS — HEIGHT: 66 IN | WEIGHT: 169 LBS | BODY MASS INDEX: 27.16 KG/M2

## 2021-06-02 VITALS — BODY MASS INDEX: 28.89 KG/M2 | WEIGHT: 179 LBS

## 2021-06-03 VITALS — BODY MASS INDEX: 28.28 KG/M2 | HEIGHT: 66 IN | WEIGHT: 176 LBS

## 2021-06-03 VITALS — BODY MASS INDEX: 28.08 KG/M2 | WEIGHT: 174 LBS

## 2021-06-04 VITALS
HEART RATE: 60 BPM | SYSTOLIC BLOOD PRESSURE: 100 MMHG | WEIGHT: 149 LBS | HEIGHT: 66 IN | BODY MASS INDEX: 23.95 KG/M2 | DIASTOLIC BLOOD PRESSURE: 80 MMHG

## 2021-06-08 NOTE — PROGRESS NOTES
"1) Reviewed option of QUAD screening, lab only visit planned for tomorrow.   2) Referral sent for 20 week .  Patient will self-schedule.  3) Beach Body diet, is on a maintenance diet and feels is eating well, getting enough food, high protein, and healthy meals. Reviewed weight gain is appropriate. Also doing \"shakeology\", reviewed ingredients and natural fruits and vegetables. Encouraged to continue with well-balanced meals and snacks.   4) Denies any cramping or bleeding currently, did have some pain one day when didn't have food prior to work.  Is able to take breaks and have snacks as able.   5) Denies quickening at this time. No questions or concerns. Warning signs discussed and anticipatory guidance given.   6) Recommend 2,000 IU Vitamin D.     "

## 2021-06-08 NOTE — PROGRESS NOTES
1) 20-week US reviewed, normal. Anticipating a girl.   2) Discussed idea of childbirth education classes.  Zahraa states reviewed information when went to [facility], plans to do self-study.  3) Began feeling movement about 1-2 weeks ago.   4) Work is okay, staying hydrated. Is able to take breaks.  5) Had the flu/GI symptoms (lasted 1-2 days) emesis a few times, but has recovered. Weight gain is appropriate  6) Having low back pain, discussed symptom management/relief and use of chiropractor.   7) Warning signs discussed of cramping, significant change in discharge, or bleeding.

## 2021-06-09 NOTE — PROGRESS NOTES
1) Plan 2nd trimester lab screening at next visit, including 1 hr GCT.  Discussed this test.  Answered questions.  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   3) Reviewed PTL symptoms.   4) Discussed volunteer and low cost  options.

## 2021-06-10 NOTE — PROGRESS NOTES
Zahraa Caicedo presents alone today.  Reports normal fetal movement. Denies cramping, bleeding or leaking of fluid. Second trimester lab testing today.  Tests include 1 hr GCT, HGB, RPR . Reviewed warning signs with emphasis on  labor/contractions and fetal movement. Does not plan on doing CBE. Signed WB consent today. We discussed r/b and guidelines. Follow up at 32 weeks.

## 2021-06-10 NOTE — PROGRESS NOTES
Nexplanon Removal Procedure Note    Pre-operative Diagnosis: nexplanon on place     Post-operative Diagnosis: Nexplanon removal     Indications: contraception change in method- has vasectomy     Allergies: *No allergies to antiseptic (alcohol) or anesthetic (Lidocaine)     Procedure Details    The risks (including infection, bleeding, pain) and benefits of the procedure were explained to the patient and verbal informed consent was obtained.     Pt was positioned on exam table with left arm next to her on the table. Insertion site was cleaned with alcohol prep at 7cm from elbow crease. 1% Lidocaine inserted intradermally and 0.5 cm incision made with #10 scalpel. Tip of Nexplanon was located and grasped with forceps, then removed without difficulty. Site was covered with band aid and a pressure dressing.     Insert measured and confirmed to be intact, 4cm in length.    Nexplanon Information:  discarded    Condition:  Stable.  Tolerated procedure well.    Complications:  None    Plan:  1.) Pt instructed not to lift heavy items with left arm for 24 hours. Instructed to keep pressure dressing on for 24 hours and band aid on for 3-5 days.   2.) She was advised to use OTC ibuprofen as needed for mild to moderate pain or ice to site as need for bruising.  3.) The patient was advised to call for any fever or for prolonged or severe pain or bleeding.   4.) Well woman exam with pap next year.     REYES Crowley,CNM

## 2021-06-10 NOTE — PROGRESS NOTES
Zahraa KUMAR Trippsree presents alone today. We reviewed labs from last visit, WNL. Reviewed  childbirth education classes  and doulas, patient does not have a  and does not plan for classes this pregnancy. Plans to have  present.   Reviewed interval of visits until end of pregnancy. Filled out check list.   Discussed writing a birth plan, encouraged to write if desires and to bring to future visit to review. Denies any PTL,  contractions, change to discharge or leaking of lopez, or change to fetal movement. Warning signs discussed with focus on fetal movement,  labor/contractions, leaking of lopez and PIH signs/symptoms. Reviewed the CNM on-call phone number versus clinic number for phone calls, and parameters for using emergency line.   Patient is tearful today, feeling anxious about labor and feeling over-whelmed financially with desire to cut back at work but needing the money. Is excited to be a mom again, but things happened so quickly and has been pregnant for most of the past 2 years. Is feeling frustrated by comments others make about how big she is, and feeling like she can't ask for help. States she doesn't ask for help easily, learned this from her mom,  tries to help but finds that hard too. We discussed pain management in labor, we discussed that part of taking control of birth is recognizing we aren't in control, and encouraged to discuss previous birth with partner to start to process and to discuss what to expect from next labor/birth. Encouraged to call CNM with questions/concerns. Offered resources for therapy, patient declines, states able to see someone through 's work but has a hard time telling strangers her problems.

## 2021-06-11 NOTE — PROGRESS NOTES
Zahraa Caicedo presents with her son today.  Reviewed GBS testing results, positive, discussed recommendation for IV antibiotics in active labor/ with PROM/SROM. Reviewed US results and variable fetal lie, we discussed risk of variable lie with focus on prolapsed cord with SROM, non-vertex presentation in labor, non-vertex presentation with SROM. We reviewed that prolapsed cord is a medical emergency and recommend immediate evaluation by CNM with SROM, we also discussed if non-vertex presentation with SROM, c/s would be indicated, we also discussed that in active labor, if non-vertex presentation, a version *may* be attempted but this also depends on numerous factors when at the hospital. Recommended that patient be seen by OBGYN to discuss version, labor, c/s and risks further, a referral was placed for Dr. Zaidi, if unable to accommodate this week will re-send referral to [facility]. Warning signs discussed at length. Phone numbers provided. Zahraa does not have any questions today as trying to let all info sink in. By leopold's fetus is vertex but head is easily moved to maternal. Discussed option of IOL at 39 weeks as well secondary to unstable lie.Reviewed CNM on-call number and when to call including for s/s of labor, srom, decreased fetal movement, bleeding, or other warning signs.

## 2021-06-11 NOTE — PROGRESS NOTES
Zahraa Caicedo presents with her  and son today. She had her visit with OBGYN today to discuss plan, per her conversation with Dr. Smith consider induction this weekend. Zahraa feels that their questions were answered, very much does not want a . She prefers to be induced this weekend at  rather than earlier. At this time plan is for arrival at [facility] on  at 7 am for possible version and IOL. Dr. Smith will be OBGYN that day and Dr. Leos, OB Chair, has agreed with this plan of care. At this time patient is aware to call with labor and/or rupture of membranes and is aware of possible risks as well as decreased chance of version if in labor, questions answered. Fetus palpates breech today, patient reports large movement overnight. No presenting part in pelvis. Discussed this plan with patient who agrees, plans to call [facility] at 6 AM to confirm and aware of risks of labor and SROM. Patient complains of vaginal itching and burning. UA negative but culture sent as treated 2 weeks ago. Wet prep also obtstained, scant amount of thick white discharge. Yeast noted on wet prep. Treatment options reviewed, patient desires to  OTC treatment. Patient is GBS positive and discussed that ABX in labor may also lead to VVC and plan to send her home with RX. Will alert CNM on day call to expect Zahraa on .    has questions today regarding contraception and safety in pregnancy, we discussed use of non-hormonal or progestin only methods, Zahraa thinks she may desire Paragard. Handouts provided. Questions briefly answered. Reviewed CNM on-call number and when to call including for s/s of labor, srom, decreased fetal movement, bleeding, or other warning signs.

## 2021-06-11 NOTE — PROGRESS NOTES
Zahraa Caicedo presents alone today. Plan for IOL yesterday declined following vertex US and unripe cervix. Having irregular contractions, no bleeding, some of mucus plug coming out. Used monistat and feels better. Reports normal fetal movement and no large movements. No longer working. Reviewed CNM on-call number and when to call including for s/s of labor, srom (patient aware of risks with unengaged fetus/variable lie), decreased fetal movement, bleeding, or other warning signs.

## 2021-06-11 NOTE — PROGRESS NOTES
OB NOTE    Patient scheduled for ECV for unstable low  Had normal but high MARSHA    Bedside US done  Vertex noted  Adequate fluid volume noted  No polyhydramnios    Discussed with Tiffany Smith MD

## 2021-06-11 NOTE — PROGRESS NOTES
Zahraa Caicedo presents alone today. Reports irregular contractions and had contractions last night q 8 minutes but went away. Denies bleeding or loss of bag of lopez. Reports normal fetal movement.  with moderate variability, no accelerations (do not meet criteria), occasional audible decelerations that do not classify as variables secondary to timing of deceleration; carrot shaped lasting <15 seconds, multiple. Recommend evaluation at [facility] today.  Irregular contractions noted, palpate moderate, at peak they take patient's breath away.  Plan for BPP. Patient undecided at this time regarding induction of labor. Aware of recommendation for induction by 42 weeks. Following SVE today would recommend cervical ripening if desires IOL. Vertex per leopold's, no presenting part in pelvis. Patient is very undecided regarding IOL, we reviewed risks and benefits of induction vs expectant management. At this time patient plans for BPP in next 1-2 days and consider visit later this week if desires IOL, otherwise visit on Monday at 41 6/7 for NST and to plan IOL though aware this plan may change based on evaluation at [facility]. Reviewed CNM on-call number and when to call including for s/s of labor, srom, decreased fetal movement, bleeding, or other warning signs.

## 2021-06-11 NOTE — PROGRESS NOTES
Zahraa is here alone for her routine prenatal visit at 35 weeks.  Denies  labor symptoms.  Reports active fetal movements.  This week and will be her last week and to work.  Pregnancy checklist completed. EPDS= 5 today.  Reviewed postpartum blues versus postpartum depression/anxiety.   will take 2-3 weeks off work, her parents live locally and will be supportive.  Reviewed warning signs and when to call.  Discussed alternative hospitals.  Plan GBS and hemoglobin at next visit.  Encouraged weekly visits until birth.

## 2021-06-11 NOTE — DISCHARGE SUMMARY
"Outpatient/Triage Note:    Patient Name:  Zahraa Caicedo  :      1990  MRN:      [id number]      Assessment:   @ 39w5d here for evaluation of fetal position and possible elective iOL.     Plan:  - Discharge to home undelivered. Reviewed warning signs including decreased fetal movement, leaking of fluid, vaginal bleeding, or signs of labor. Reviewed how to contact on-call CNM. Follow-up in clinic with CNM as scheduled on Monday 7/3/17 or sooner as needed. All questions answered. Agrees with plan.     Subjective:  Zahraa Caicedo is a 27 y.o.  at 39 5/7 weeks, with an EDC of 17 who presented to [facility] for evaluation of fetal position for possible ECV and elective IOL. Denies leaking of fluid, bleeding, or changes in fetal movement.     Objective:  Vital signs: /69 (Patient Position: Semi-zarate, Cuff Size: Adult Regular)  Pulse 86  Temp 98.2  F (36.8  C) (Oral)   Resp 16  Ht 5' 4\" (1.626 m)  Wt 188 lb (85.3 kg)  LMP 2015 Comment: No menses since last birth  BMI 32.27 kg/m2  FHR: category I tracing  Uterine contractions: mild irreg ctxs    Physical Exam: ultrasound confirms vertex position, Dr Smith IHOB in to see pt and confirmed no need for ECV  Abdomen: SIUP, vertex by Leopold's, abdomen non-tender  SVE: 1cm/50%/-3/post/soft   Muñoz score=4  Explained process of cervical ripening and reviewed all options including oral or vaginal Cytotec, Balloon catheter for mechanical ripening, or Cervidil followed by IOL with Pitocin when Muñoz score 6 or greater. Reviewed risks and benefits of elective IOL including prolonged hospital stay, if cervical ripening not successful possible D/C undelivered, increased risk of C/S if failure or complications arise during IOL. Also discussed with fetal station very high and  not engaged in pelvis that if SROM at home there is increased risk of prolapsed cord which is an emergency situation and she would need to return to hospital ASAP. She " is +GBS and will need prophylaxis with antibiotics when in active labor or with SROM and pt is aware to call CNM prn.    Temp:  [98.2  F (36.8  C)] 98.2  F (36.8  C)  Heart Rate:  [86] 86  Resp:  [16] 16  BP: (128)/(69) 128/69  No intake or output data in the 24 hours ending 07/02/17 0826    Results:  Ultrasound confirms vertex    Provider:REYES Yañez,CURT      TT with patient 30mn >50% time spent counseling.

## 2021-06-11 NOTE — PROGRESS NOTES
Zahraa Caicedo presents alone today. She was treated for a UTI by a general practitioner last week, repots symptoms have cleared up. GBS testing today, discussed and answered questions. Hemoglobin also drawn today. Reviewed phone numbers, hospitals, and possibility of divert. Questions answered. Reviewed early versus active labor. Breast pump-- already has. Needs new-parts.  Encouraged weekly visits through EDB.   Reviewed feeding choice and resources provided such as LLL. Breast fed x 6 months, then went back to work. Hoping to breastfeed for longer.   Postpartum contraception reviewed-- handout provided, encouraged to review LARC options. Also discussed POP.  Warning signs reviewed:  labor/contractions, loss of fluids, fetal movement, PIH signs/symptoms.   Referred for US as unable to confirm presentation. Vertex via leopold's, no presenting part in pelvis.   Patient [place]. Records given. Warning signs discussed.   [place] URINE CULTURE

## 2021-06-12 NOTE — PROGRESS NOTES
Subjective:    Zahraa presents today for an IUD insertion.  She received a consultation at her postpartum visit.  She desires the ParaGard IUD.  She is not currently having sex.  She is breast-feeding and does not have a menses.    IUD Insertion Procedure Note    Pre-operative Diagnosis: desires contraception    Post-operative Diagnosis: normal, same    Indications: contraception    No contraindications to Paragard IUD:   Not pregnant  No abnormality of uterus distorting cavity.  No acute PID or pelvic infection  No postpartum endometritis or postabortal endometritis in past 3 months  No uterine or cervical malignancy  No abnormal uterine or vaginal bleeding of unknown etiology  No mucopurulent cervicitis  No Mohsen's disease  No allergy to any component of Paragard      Procedure Details   Urine pregnancy test was done today and result was negative.  GC/CT done today and result pending. The risks (including infection, bleeding, pain, and uterine perforation) and benefits of the procedure were explained to the patient and Written informed consent was obtained.    Bimanual Exam performed, uterus is retroverted  Cervix cleansed with Betadine. Tenaculum placed at 6 o'clock. Uterus sounded to 8 cm. IUD inserted without difficulty. String visible and trimmed to 2-3 cms. Patient tolerated procedure well.    IUD Information:  ParaGard.  Lot # [id number]  Condition:  Stable    Complications:  None    Plan:    The patient was advised to call for any fever or for prolonged or severe pain or bleeding.  Discussed PAINS: (Period related problems or pregnancy symptoms, abdominal pain/pain during intercourse, infections/unusual discharge, not feeling well; fever, chills, string problems)  Nothing in the vagina x 24 hours.   She was advised to use OTC analgesics as needed for mild to moderate pain.   Advised to check string monthly, and RTC in 6 weeks to check IUD.    REYES Schaeffer, CURT

## 2021-06-12 NOTE — PROGRESS NOTES
Assessment:   Normal postpartum exam at ~6 weeks postpartum.   lactating    Plan:    1. Pap smear not done at today's visit.  Pap test due in 2018, and recommend annual examination.  2. Reviewed return to fertility, child spacing, and discussed contraception. Desired contraception: Paragard IUD.   Reviewed mechanism of action, what to expect from placement, common side effects, recommend no intercourse for 2 weeks prior to insertion.  Zahraa has the handout and will review, will call with any questions or concerns, and self schedule.  3. Discussed resumption of exercise and setting a goal to return to pre-pregnancy weight in the next 6-12 months.   4.  Resumption of intercourse reviewed with possible changes in libido and vaginal lubrication while nursing.  Reviewed Kegel exercises, as well as pablo beads, recommend if no improvement in 3-6 months with Kegel exercises to have an appointment to reevaluate, and consider pelvic floor therapy.  4.  Nutrition and supplements reviewed.  Advised continuation of a prenatal or multivitamin.  5. Adjustment to parenting, self care and open communication with her support system discussed. Warning signs and symptoms related to postpartum mood disorders reviewed.     All questions answered.  Encouraged to follow-up as needed.      Total time spent with patient 40 minutes, >50% counseling, education and coordination of care.    Subjective:     Zahraa Caicedo is a 27 y.o. female who presents for a postpartum visit. She is 6 weeks postpartum following a spontaneous vaginal delivery. I have fully reviewed the prenatal and intrapartum course. The delivery was at 41 gestational weeks. Outcome: spontaneous vaginal delivery. Postpartum course has been stable. Baby's name is Mara and weighed 8# 9 oz at time of birth. Baby's course has been stable. Baby is feeding by breast, occasionally takes a bottle of EBM.  Mara had a lip and tongue tie that was modified to weeks postpartum.  Bled  for  5 weeks postpartum.  Currently bleeding  thin lochia. Bowel function is normal. Bladder function is normal.  Reports immediately postpartum having some issues with incontinence.  Has been working on Kegel exercises.  [place] postpartum depression screening score: 0.   She has resumed regular exercise.  Currently doing yoga and other stretching and cardio activity.  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  Patient has not resumed intercourse.   Contraception method is Desires ParaGard IUD.  Plans to review the literature and schedule an appointment.   Sleeping Habits:  co-sleeps or in a bassinet.  ETOH/Tobacco/Drug use: Denies    Review of Systems  General:  Denies problem  Eyes: Denies problem  Ears/Nose/Throat: Denies problem  Cardiovascular: Denies problem  Respiratory:  Denies problem  Gastrointestinal:  Denies problem, Genitourinary: Denies problem  Musculoskeletal:  Denies problem  Skin: Denies problem  Neurologic: Denies problem  Psychiatric: Denies Problem  Endocrine: Denies problem    Objective:         Physical Exam:  General Appearance: Alert, cooperative, no distress, appears stated age  Skin: Skin color, texture, turgor normal, no rashes or lesions  Throat: Lips, mucosa, and tongue normal; teeth and gums normal  HEENT: grossly normal; otoscopic and opthalmic exam not performed.   Neck: Supple, symmetrical, trachea midline, no adenopathy;  thyroid: not enlarged, symmetric, no tenderness/mass/nodules  Lungs: Clear to auscultation bilaterally, respirations unlabored  Breasts: Deferred  Heart: Regular rate and rhythm, S1 and S2 normal, no murmur, rub, or gallop  Abdomen: Soft, non-tender, 1FB diastasis  Pelvic:External genitalia normal without lesions or irritation. Vagina and cervix show no lesions, inflammation, discharge or tenderness. Shallow periurethral abrasion healed.  No cystocele, No rectocele. Uterus fully involuted.  No adnexal  mass or tenderness. Poor vaginal tone with kegel exercise.   Extremities: Extremities normal without varicosities or edema       Last Pap: 2015. Results were: normal  Immunization History   Administered Date(s) Administered     Influenza, inj, historic 11/18/2015     Influenza,seasonal quad, PF, 36+MOS 12/12/2016     Tdap 04/17/2017     Immunization status: up to date and documented

## 2021-06-13 NOTE — PROGRESS NOTES
"Zahraa Caicedo  [birth year]  [id number]               IUD removal        Assessment:   1. IUD removal   2. Contraceptive Counseling  3. Multi-body system symptoms    Plan:  1. Discussed with patient the common side effects of Paragard IUD, reviewed that these symptoms may not be related to the IUD and discussed my suspicion with patient for anxiety and depression, patient states multiple times she believes with IUD removal these symptoms will improve. Discussed removal, side effects, and discussed low though possibility of pregnancy with removal and intercourse within two days, offered RX for plan B or for patient to purchase on own.   2. I strongly recommend that if no improvement in symptoms in 2 weeks that she consider referral to ENT and therapy/counselor/mental health services. I also reviewed immediate warning signs.   3. Recommend focusing on regular eating of high protein means and snacks and increasing water intake at this time.   4. Discussed use of fiber, miralax and stool softeners.   5. Discussed use of caffeine for headaches or Excedrin migraine.   6. Reviewed condom use.       Total time: 25 minutes spent counseling and coordinating care not including the procedure.   REYES Schaeffer, [facility]    Subjective:   Zahraa presents today to review symptoms. Zahraa has a Paragard IUD that was placed on 8/28/2017 and feels that this has caused numerous symptoms. She was seen in ED last week for symptoms and diagnosed with \"nausea and emesis\"-- she reports she was given Zofran and felt better but continuing to have issues. Zahraa believes that her symptoms are related to the Paragard and thinks she has too much copper in her system. Last intercourse was 2 days ago she believes. She states she desires to use condoms for now but may be interested in a third pregnancy. She is breast-feeding on demand and this is going well though 1 night she had some aversion to breast-feeding. She feels " "she is getting enough food and water to support breast-feeding.     Constipation: reports noticing difficulty passing stool for past month, has used Miralax \"a few times\" and this has led to soft formed stool but no diarrhea. She is no longer using any stool softeners. She reports she drinks plenty of water.   Anxiety/depression: Reports increased anxiety regarding to her sensation of dizziness which leads to feeling like she can't do anything but sit on the couch and feed her daughter. She reports her anxiety is worse than her depression. She denies any thoughts of harming herself or others and no scary thoughts. She states a history of depression \"a long time ago\". She denies any recent drug use, and reports occasional alcohol use but nothing that is concerning.   Headache: Non-stop for the past few weeks, denies any vision changes that occur with HA, has tried ibuprofen and OTC medicine without any success. Has not been drinking as much caffeine in the past week or so.   Dizziness: States dizziness is \"nonstop\" and wasn't able to work last week, drove herself to clinic today as has noticed improvement.  Bloating/perception of weight gain: Reports noticing increased weight and feeling bigger/bloated.  Loss of appetite & nausea: (though possibly food aversion)-- she is vague when discussing this symptom, feels that at times she is not hungry at all but wants to eat, or sometimes she is very hungry but can't eat much. She denies regular emesis (did occur on day of ED visit) but reports ongoing nausea.    Cramping: Occasional uterine cramping but no bleeding.     ROS: positive for all listed above, all other reviewed and negative.     Objective:   /60  Pulse 62  Ht 5' 6\" (1.676 m)  Wt 169 lb 6.4 oz (76.8 kg)  Breastfeeding? Yes  BMI 27.34 kg/m2  PHQ-9: 9.   No #9.       Procedure:   Reviewed risks and benefits of procedure, verbal and written consent obtained. Speculum placed and two IUD strings noted at " os, these strings were grasped with ring-forceps and easily removed. No bleeding noted.

## 2021-06-13 NOTE — PROGRESS NOTES
"Assessment:      27 y.o., routine Paragard IUD check -  6 weeks post-insertion, correct placement confirmed.          Subjective:      Zahraa Caicedo is a 27 y.o. female who presents for IUD check. This was inserted on 8/28/17. The patient has no complaints today. The patient is sexually active. She has checked her IUD strings.    Family also has questions about some nipple redness she has noticed on both of her nipples.  She states that it is mildly itchy but nothing significant.  She states she has noticed it for a while but is not exactly sure when it started.  She has not noticed any pain or burning while breast-feeding, vaginal yeast infection symptoms or any thrush in her baby.  She has been using a mother love diaper rash and thrush cream on her nipples.  She does use disposable breast pads she states that she changes them every time she breast-feeds.  She does also give the baby a bottle when she goes to work.  She is just wondering if she might have a possible yeast infection in her nipples.  She denies any drainage from the area or any significant pain.    Review of Systems  Pertinent items are noted in HPI. , otherwise negative    Objective:       Pelvic:SE only - normal vaginal and mucosal tissue.  No abnormal discharge or odor.  Cervix normal appearance with IUD strings visible x 2 without evidence of IUD itself.  Approximately 2 cm long.       /60  Pulse 60  Ht 5' 6\" (1.676 m)  Wt 169 lb (76.7 kg)  Breastfeeding? Yes  BMI 27.28 kg/m2    General Appearance:    Alert, cooperative, no distress, appears stated age   Head:    Normocephalic, without obvious abnormality, atraumatic   Eyes:    PERRL, conjunctiva/corneas clear, EOMs grossly intact       Nose:   Nares normal, septum midline, mucosa normal, no drainage   Throat:   Lips, mucosa, and tongue normal; teeth and gums normal           Cardiovascular:     Respirations unlabored   Breast:  Mild erythema noted to nipple tissue bilaterally. No " open lesions. No excoriation. No scabbing. No broken skin.    G/U:  normal vaginal and mucosal tissue.  No abnormal discharge or odor.  Cervix normal appearance with IUD strings visible x 2 without evidence of IUD itself.  Approximately 2 cm long.                            Skin:   Skin color, texture, turgor normal, no rashes or lesions, except as noted on breast exam.       Neurologic:  Psych:   Alert & Oriented x4   Normal speech. Good eye contact. Does not appear anxious or depressed           Assessment:        1. Health care maintenance    2. IUD check up    3. Postpartum nipple pain           Plan:     1.  Reassurance re: correct placement, normalcy of side effects and that these often lessen with extended use of IUD.   2.  Taught and encouraged to check IUD strings monthly.    3.  Warning signs related to IUD use (PAINS) and when to call CNM reviewed.  4. APNO sent to [facility] to use PRN. Also discussed using coconut oil which has a mild antifungal effect to nipples instead of her current nipple cream (she is using diaper rash and thrush cream from [employer]). Discussed thorough hand washing, changing breast pads frequently, sterilization of bottles, washing nursing bras and burp clots in hot soapy water, reviewed symptoms of nipple yeast infection and vaginal yeast infection and thrush.     Total time spent with patient 20 minutes, >50% counseling, education and coordination of care.        REYES Delacruz, [facility]   10/9/2017 3:54 PM

## 2021-06-16 PROBLEM — Z00.00 HEALTHCARE MAINTENANCE: Status: ACTIVE | Noted: 2018-10-25

## 2021-06-21 NOTE — PROGRESS NOTES
PRENATAL VISIT   FIRST OBSTETRICAL EXAM - OB    Assessment / Impression     First prenatal visit at 10w5d    Breast-feeding    Plan:     -IOB labs drawn including Pap test.  -No indication for lead draw.  -Patient is not interested in water birth.  -Reviewed prenatal care schedule and discussed routine OB visits versus problem visits and referrals. Also discussed use of ultrasound in pregnancy, dating ultrasound ordered due to irregular cycles.  -Optimal nutrition reviewed, did not discuss weight gain--please review at [place].   -Reviewed importance of regular exercise in pregnancy and help with low back pain and other pregnancy symptoms.   -Discussed importance of taking aprenatal vitamin with the goal of having 400 mcg of folic acid. -Anticipatory guidance for common pregnancy questions and concerns reviewed.   -Danger s/sx for this trimester reviewed with patient.  -Reviewed genetic carrier screening and genetic aneuploidy screening options.  Patient aware of options, aware of risks and benefits of testing.  Patient plans to discuss with insurance and spouse.    -Reviewed MyChart, lab results, and how lab results are disseminated.   -IOB packet given and reviewed with patient, discussed standards of care, scope of practice, clinic and hospital settings, also reviewed clinic versus emergency phone number.   -Discussed baby friend hospitals and policies.  -Reviewed student involvement.  -[facility] services and hospital options reviewed; emergency and scheduling phone numbers given to patient. Discussed difference between emergency and scheduling line. Encouraged to call scheduling with all non-urgent inquiries.   -Discussed routine prenatal care versus problem visits in pregnancy and the billing implications.   -Discussed breast-feeding in pregnancy, recommend increasing hydration and really focusing on nutrition.  We discussed the reasons why we might consider recommending weaning or decreasing such as signs of   labor or poor weight gain but at this time would recommend patient continues to breast-feed for as long as she desires.  -Return to clinic 4-6 weeks.    Patient desires flu shot next visit.     Total time spent with patient: 30 minutes, >50% time spent counseling and coordinating care.  REYES Jefferson, [facility] Nurse[facility]Midwives      Subjective:    Zahraa Caicedo is a 28 y.o.  here today for her First Obstetrical Exam.  This is a somewhat planned pregnancy and patient is excited.  Mood has been stable, E PDS equals 1 today.  Denies any vaginal bleeding or change to vaginal discharge but some cramping.  Has been struggling with constipation, is trying to increase water intake.  Patient has been exercising 4 times a week with weights and cardio, was taking a multivitamin but this was causing a lot of nausea so switching to a gummy.  Patient shares that she is having fatigue and nausea, she stopped drinking caffeine and took Excedrin with caffeine in it which helped with some of her symptoms.  Having some food aversion but feels that she is doing well and does not need any medication assistance.  Patient reports 3 cycles prior to getting pregnant, cycles were between 32 and 35 days.    Exercise: 4 times a week: Weight lifting and cardio.  We discussed safety in pregnancy and recommend continuing to be physically active.  Safety (seatbelt, gun access, IPV, hx abuse): Yes, unknown, no, no  Tobacco/Alcohol/Drug Use: No use in pregnancy, moderate alcohol use prior to pregnancy  EPDS today: 1  Sexual Partners: One  Last Breast Exam: At least 2 years ago    Education level: College  Occupation: Stay-at-home mom  Partners name: Amalia    Patient denies any history of  labor,  birth, gestational diabetes, gestational hypertension/pre-eclampsia, previous  section, previous uterine surgery/scar, postpartum hemorrhage, shoulder dystocia.     OB History    Para Term  AB  "Living   3 2 2   2   SAB TAB Ectopic Multiple Live Births      0 2      # Outcome Date GA Lbr Levi/2nd Weight Sex Delivery Anes PTL Lv   3 Current            2 Term 07/11/17 41w0d 03:24 / 00:12 8 lb 9 oz (3.884 kg) F Vag-Spont Inhalation N KARL   1 Term 02/27/16 40w6d  8 lb 4 oz (3.742 kg) M Vag-Spont None N KARL      Complications: Dysfunctional Labor          Expected Date of Delivery: 5/15/2019, by Last Menstrual Period    Past Medical History:   Diagnosis Date     Alcohol consumption binge drinking     HX binge driking prior to first pregnancy 5/night 3x per week. Reports light use prior to this preg.     Cholelithiases     2004; passed     Past Surgical History:   Procedure Laterality Date     WISDOM TOOTH EXTRACTION       Social History   Substance Use Topics     Smoking status: Never Smoker     Smokeless tobacco: Never Used     Alcohol use No     Current Outpatient Prescriptions   Medication Sig Dispense Refill     prenatal no.25-iron-FA #6-dha (PRENA1) 30 mg iron-1mg -200 mg cap Take by mouth daily.       cholecalciferol, vitamin D3, (VITAMIN D3) 1,000 unit capsule Take 1,000 Units by mouth daily.       loratadine (CLARITIN) 10 mg tablet Take 10 mg by mouth daily.       No current facility-administered medications for this visit.      No Known Allergies          Pregnancy Risk Factors: None      Review of Systems  General:  Denies problem  Eyes: Denies problem  Ears/Nose/Throat: Denies problem  Cardiovascular: Denies problem  Respiratory:  Denies problem  Gastrointestinal: Mild nausea  Genitourinary: Denies problem  Musculoskeletal:  Denies problem  Skin: Denies problem  Neurologic: Denies problem  Psychiatric: Denies problem  Endocrine: Denies problem  Heme/Lymphatic: Denies problem   Allergic/Immunologic: Denies problem       Objective:   Objective    Vitals:    10/22/18 1616   BP: 110/72   Pulse: 84   Weight: 154 lb 14.4 oz (70.3 kg)   Height: 5' 6\" (1.676 m)     Physical Exam:  General Appearance: Alert, " cooperative, no distress, appears stated age  Head: Normocephalic, without obvious abnormality, atraumatic  Eyes: Conjunctiva/corneas clear, does not wear corrective lenses  Neck: Supple, symmetrical, trachea midline, no adenopathy  Thyroid: not enlarged, symmetric, no tenderness/mass/nodules  Back: Symmetric, no curvature, ROM normal, no CVA tenderness  Lungs: Clear to auscultation bilaterally, respirations unlabored  Heart: Regular rate and rhythm, S1 and S2 normal, no murmur, rub, or gallop  Breasts: breasts appear normal, no suspicious masses, no skin or nipple changes or axillary nodes  Abdomen: Soft, non-tender, bowel sounds active all four quadrants, no masses.   FHT: 165   Vulva:  no sign of lesions or condyloma, normal hair distribution  Vagina: pink, normal rugae, no abnormal discharge  Cervix:  long/thick/closed, no lesions or inflammation noted  Extremities: Extremities normal, atraumatic, no cyanosis or edema  Skin: Skin color, texture, turgor normal, no rashes or lesions  Lymph nodes: Cervical, supraclavicular, and axillary nodes normal  Neurologic: Alert and oriented x 3.

## 2021-06-22 NOTE — PROGRESS NOTES
Zahraa Caicedo presents with two children today, reviewed option of QUAD screening and patient accepts today.Referral sent for 20 week US.  Patient will self-schedule. Discussed how these results will be disseminated with patient.  Zahraa is unsure if she has felt quickening, we discussed parameters for fetal movement at this time.  Patient shares that she is having a lot of headaches, is occasionally drinking caffeine and taking Tylenol, has tried to increase her water intake, we discussed physiologic changes of pregnancy and ways to alleviate headaches.  She shares that she is eating more however still has some food aversion, reviewed total and interval weight gain.  Patient is moving so feeling somewhat more stressed at this time, she and her spouse have purchased a house in [place], this is a little bit further from [employer] but she does desire to birth there.  Zahraa has some questions today about the chances that she will have polyhydramnios and group B strep but with this pregnancy, questions answered.  Zahraa has questions about CBD oil which she used prior to pregnancy for anxiety, sure that I do not know a lot about this use in pregnancy and will do research and send her a my chart message.  My also shares that she is having some vulvar itching and vaginal discharge though she feels that this discharge is physiologic, she is wondering if she has a yeast infection.  She does not feel that she is able to do a pelvic exam today with HER-2 young children here, I recommended over-the-counter Monistat for 7 days, and if no improvement within 3-4 days to come back to clinic or schedule a E visit.  All questions answered.  Reviewed warning signs and reasons to call the on-call midwife.  Return to clinic in 4-6 weeks.

## 2021-06-22 NOTE — TELEPHONE ENCOUNTER
Returned patient's call: States she has had a cough, runny nose, and sore throat for a couple of days. States she had a fever yesterday but does not today. Has been able to hydrate well at home. Drinking water and pedialyte. Wondering what medication she can take in pregnancy. I reviewed the Colds In Pregnancy handout with the patient over the phone as well as warning signs and when to call back.          Colds in Pregnancy              [facility] Nurse-Midwives   Washing hands frequently is best prevention for the common cold.  Call your midwife if:    Cough is productive with yellow or green sputum    Persistent or severe sore throat or difficulty swallowing    Fever over 100.4  if lasting over 24 hours or if one reading is 101 or higher.    Symptoms do not improve within 7-10 days    Home Remedies  Warm Salt Water: Salt-water rinsing helps break nasal congestion, while also removing virus particles and bacteria from your nose. Here's a popular recipe:  Mix 1/4 teaspoon salt and 1/4 teaspoon baking soda in 8 ounces of boiled water that has been cooled. Use a bulb syringe to squirt water into the nose. Hold one nostril closed by applying light finger pressure while squirting the salt mixture into the other nostril. Let it drain. Repeat two to three times, then treat the other nostril. You may also use a NetiPot and let the liquid flow into one nostril and out the other. See the package for instructions.  Stay Hyrated, Warm and Rested: with first symptoms of a cold or the flu.  Gargle: Gargling can moisten a sore throat and bring temporary relief. Gargle with half a teaspoon of salt dissolved in 8 ounces warm water, four times daily.   To reduce the tickle in your throat, try an astringent gargle -- such as tea that contains tannin -- to tighten the membranes. Or use a thick, viscous gargle made with honey or honey and apple cider vinegar. Steep one tablespoon of raspberry leaves or lemon juice in two cups of hot  water; mix with one teaspoon of honey. Let the mixture cool to room temperature before gargling.  Drink Hot Liquids: Hot liquids relieve nasal congestion, prevent dehydration, and soothe the uncomfortably inflamed membranes that line your nose and throat. If you're so congested that you can't sleep at night, try a warm mixture of honey and lemon or honey and tea.  Take a Steamy Shower: Steamy showers moisturize your nasal and helps you relax. If you're dizzy from the flu, run a steamy shower while you sit on a chair nearby and take a sponge bath.  Use a Salve Under Your Nose: A small dab of mentholated salve under your nose can help to open breathing passages and restore the irritated skin at the base of the nose. Try menthol, eucalyptus, or camphor.  Apply Hot or Cold Packs Around Your Congested Sinuses: Either temperature works. You can buy reusable hot or cold packs at a drugstore or make your own. You can apply heat by taking a damp washcloth and heating it for 55 seconds in a microwave (test the temperature first to make sure it's not too hot.) A small bag of frozen peas works well as a cold pack.  Sleep With an Extra Pillow Under Your Head: Elevating your head will help relieve congested nasal passages. If the angle is too awkward, try placing the pillows between the mattress and the box springs to create a more gradual slope.    Medications That Appear Safe in Pregnancy   As a rule  -    1) Choose over the counter remedies with a long history of use. We know more about their effects than  newer  medications. For example, we know more about  first generation , antihistamines than we know about  second generation  (non-drowsy formulations).   2) Try Category B first as these medications appear safe in pregnancy whereas evidence of harm may have occurred in animal studies with Category C drugs but never in humans.   3) Choose over-the-counter remedies with the fewest ingredients.   4) Try taking half the dose  recommended initially or take the medication only before sleep.   5) Try  home remedies  before heading for the pharmacy.   6) Use Tylenol or Extra-Strength Tylenol in pregnancy instead of aspirin or ibuprofen.  Try the medications below if the home remedies do not seem to relieve severe symptoms:             (use according to package instructions or may start with half dose initially)    Cetirizine (Zyrtec) Category B    Chlorpheniramine (Chlor-trimeton) or Loratadine (Claratin, Alavert)   both Category B    Dimenhydrinate (Dramamine)  Category B    Diphenhydramine (Benadryl) Category B    Doxylamine (Unisom, Dora-Charlotte Cold) Category B    Guaifenesin (Robitussin, Mucinex) Category C    Dextromethorphan or DM (all multi-symptom cold remedies) Appears to be safe but avoid multi-ingredient remedies.  Category C    Hydroxyzine (Vistaril) or Fexofenadine (Allegra) both Category C    Pheniramine (Visine-A, Opcon-A) Category C    Pseudoephedrine (Sudafed)  NOT IN FIRST TRIMESTER  Category C    Desloratadine (Clarinex) Category C

## 2021-06-22 NOTE — TELEPHONE ENCOUNTER
Name of caller: Patient  Phone number where you may be reached: [phone]  Reason for call: Pt is not feeling well with cough, runny nose and fever. She would like to know what she is able to take that will be safe.  Best time to call back: any  If we don't reach you, is it okay to leave a detailed message? yes

## 2021-06-23 NOTE — PROGRESS NOTES
Zahraa Caicedo presents today alone. Reports she is feeling well, had FLU and it went through the whole family so somewhat fatigued. Denies any cramping or contractions at this time, had some mild cramping when sick but improved with hydration.  Reports her appetite has improved, we reviewed her weight gain is appropriate.  Reviewed 20 week US results, questions answered, patient shares that she is expecting a girl.  Zahraa has a few questions today regarding delayed cord clamping, breast tenderness now that she has weaned her youngest, and wondering if she can use her convertible car seat versus a bucket seat for maternity and I shared with the patient that I will reach out to [facility] for confirmation and send her an email.  We also shares that she is noticing she is having anxiety as she thinks about impending labor, she was induced for both labors and felt that the second was very overwhelming/traumatic with baby coming out with poor color and being brought to warmer immediately, we discussed various ways to face this and prepare for future labor.  We also discussed pain management options including nitrous oxide, epidural, and IV fentanyl.  Discussed plan for 2nd trimester lab screening at next visit: GCT 1 hour, RPR, Hemoglobin. Also discussed T-Dap immunization and benefits in pregnancy. All questions answered.  Zahraa plans to have her partner present for her birth and no one else other than midwife and nurse team.  Previously removed the idea of water birth but may be interested in water birth this time around, plan to draw hepatitis C with her next labs and signed water birth consent.  Warning s/sx reviewed with patient.  I recommended she call the CN on-call at [phone] in the case that that she has abdominal cramping or bleeding.  We also discussed parameters for fetal movement.  Return to clinic in 4-6 weeks.

## 2021-06-23 NOTE — PATIENT INSTRUCTIONS - HE
[facility] Nurse Midwives - Contact information:  Appointment line and to get a hold of CNM in clinic Monday-Friday 8 am - 5 pm:  [phone].  There are some clinics with early start times (1st appointment 7:40 am) and others with evening hours (last appointment 6:20 pm).  Most are typically open from 8 am to 5 pm.    CNM on call answering service: [phone].  Specify your hospital of choice and leave a brief message for CNM;  will then page CNM who is on call at your specified hospital and you should receive a call back with 15 minutes.  Be sure that your ringer is audible and that you can accept blocked calls so that we can get back in touch with you! This number should be reserved for urgent needs if during the day, before 8 am, after 5 pm, weekends, holidays.    Contact the on-call CNM with warning signs, such as:    vaginal bleeding     Vaginal discharge and itching or pain and burning during urination    Leg/calf pain or swelling on one side    severe abdominal pain    nausea and vomiting more than 4-5 times a day, or if you are unable to keep anything down    fever more than 100.4 degrees F.          You are invited to  Meet the [facility] Nurse-Midwives  A way to tour the hospital Labor and Delivery unit and meet the midwives that attend births since you may not have the opportunity to meet them during your prenatal care.  Some sessions are informal meet and greet type social hours, others address a specific concern or topic.    2019 7-8pm  [facility]    2019 7-8pm  [facility]    Thursday, August 15, 2019 7-8pm  [facility]    2019 7-8 pm  [facility]    Please call [phone] to register      UNDERSTANDING  LABOR    Going into labor before your 37th week of pregnancy is called  labor.   labor can cause your baby to be born too soon. This can lead to a number of health problems that may affect your baby. From 28-35 weeks, Patients are advised to be evaluated at [facility] since they have a  Intensive Care Unit (NICU).  -Before labor, the cervix is thick and closed.  -In  labor, the cervix begins to efface (thin) and dilate (open) over a short period of time    Symptoms of  Labor  If you believe you re having  labor, contact the midwives right away. But contractions alone don t mean you re in  labor. What matters more are changes in your cervix (the lower end of the uterus). Symptoms of  labor include:    five or more contractions per hour    Strong & frequent contractions    Constant menstrual-like cramping    Low-back pain    Mucous or bloody vaginal discharge    Bleeding or spotting in the second or third trimester    Evaluating  Labor  Your midwife will try to find out whether you re in  labor or whether you re just having contractions.She may watch you for a few hours. The following tests may be done:    Pelvic exam to see if your cervix has effaced (thinned) and dilated (opened)    Uterine activity monitoring to detect contractions    Fetal monitoring to check the health of your baby    Ultrasound to check your baby s size and position    Caring for Yourself At Home  If you have contractions  but your cervix is still thick and closed, the midwife may ask you to do the following at home:    Drink plenty of water.    Do fewer activities.    Rest in bed on your side.    Avoid intercourse and nipple stimulation.    When to Call Your Midwife    Five or more contractions per hour    Bag of water breaks    Bleeding or spotting     If You Need Hospital Care   labor often requires that you have hospital care and complete bed rest. You may have an IV (intravenous) line to get fluids. And you may be given pills or  an injection to help prevent contractions. Finally, you may receive medication (corticosteroids) that helps your baby s lungs mature more quickly.    Are You At Risk?  Any pregnant woman can have  labor. It may start for no reason. But these risk factors can increase your chances:    Past  labor or past early birth    Smoking and drug or alcohol use during pregnancy    Multiple fetuses (twins or more)    Problems with the shape of the uterus    Bleeding during the pregnancy    The Dangers of  Birth  A baby born too soon may have health problems. This is because the baby didn t have enough time to mature. The baby then is at risk of:    Not breastfeeding well    Having immature lungs    Bleeding in the brain    Dying    Reaching Term  Your goal is to get as close to term as you can before giving birth. The closer you get to term, the higher your chance of having a healthy baby. Work with your healthcare provider. Together, you can take steps that may keep you from giving birth too early.        Testing for Gestational Diabetes in Pregnancy  [facility] are committed to providing safe care during your pregnancy. We follow the recommendations of the American Diabetes Association and the American College of Obstetricians and Gynecologists to test all pregnant women for gestational diabetes. Testing early in pregnancy (if you have risk factors) and testing all women between 26-28 weeks follows local and national guidelines for care during pregnancy. Clients who feel that they cannot consent to such testing, may choose to transfer their care to our consultant obstetricians.  What is the test?  Eat normally on the day of the test; a diet rich in protein, whole grains, and vegetables would be best. Avoid simple sugars, white flour products and juices prior to testing.  You will be asked to drink a 10 oz glucose beverage (50 gm, about the same as a can of root beer).  The product is not  carbonated as it has to be consumed within 5 minutes. During the next hour, you are seen for a prenatal visit, but are asked to limit your activity around the clinic. Feel free to bring a book or your computer.   Any level less than 140 for this  glucose challenge test  is considered normal. If measured at 140 to 185, the diagnostic test, a  3 hour glucose tolerance test  will be conducted. If 2 or more readings are abnormal, the diagnosis of gestational diabetes is confirmed and a referral to a diabetes educator will be made. If the level is 185 or above, this confirms the diagnosis and a referral will be made without administering the 3 hour test.  A fasting blood sugar may be performed sometime in the first trimester if you have risk factors for the development of gestational diabetes such as a previous diagnosis or birth of a large baby or a close family relative with diabetes.  What is gestational diabetes?  Your body converts what you eat into glucose. In response to rising blood sugar levels it secretes insulin in order to be able to utilize that glucose. During pregnancy as the placenta grows and matures, it secretes hormones that are necessary to assure baby s growth and development, however, they also reduce the action of insulin in the mother. In some cases too much insulin is blocked (this is called  insulin resistance ) and blood sugar in the mother rises above a normal level. Pregnant women who have never had diabetes before but who have high blood sugar (glucose) levels during pregnancy are said to have gestational diabetes. Gestational diabetes affects about 4-7% of all pregnancies.  What if I have gestational diabetes?  If either of the tests for gestational diabetes show that you have this condition, a referral will be made to visit with the diabetes educator. The educator will help you to make wise food choices, count carbohydrates, monitor your blood sugar and record your levels in a journal. We  ask that you bring this diary with you at each prenatal visit. You may meet with the educator several times during the remainder of your pregnancy.  How gestational diabetes can affect your baby  Some mothers may wonder why testing for GDM is delayed until the early part of the 3rd trimester for most women. Gestational diabetes has an impact on the baby at the time of rapid body growth. When the mother s blood has elevated sugar levels, extra glucose crosses the placenta causing the baby to have excess weight gain ( macrosomia ). Some babies are too big to be born vaginally so their mother must have  births. Babies of mothers with uncontrolled gestational diabetes may have difficult births that can cause trauma to the mother and in rare circumstances, babies may suffer fractures or oxygen deprivation during birth. They may have difficulty maintaining their own blood sugar after birth and they may also have trouble adapting to life outside. Recent research indicates that babies of mothers with uncontrolled or undiagnosed gestational diabetes are at risk for obesity and type 2 diabetes. Women who develop gestational diabetes are more likely to develop type 2 diabetes within 15 years after their pregnancy.  Additional Information  The American College of Nurse Midwives (ACNM) provides an information sheet describing diabetes screening in pregnancy: http://www.womensdocs.com/jo/pdf/Second_Trimester/Gestational_Diabetes.pdf    You can visit the American Diabetes Association website http://www.diabetes.org for additional information and to purchase their book,  Gestational Diabetes: What to Expect .    A brochure from the American College of Obstetrics and Gynecology is available at:   http://www.acog.org/~/media/For%20Patients/waw355.pdf?dmc=1&bs=[id number]  Testing for gestational diabetes is a critical part of your prenatal journey. Thank you for taking good care of yourself and your baby.    HEALTHY  PREGNANCY CARE: 22-26 WEEKS PREGNANT    You are finishing your second trimester. Your baby is developing rapidly. At this stage, babies have a sense of balance, can respond to touch, and are recognizing parent voices.  Your baby will be moving around more now.  You may notice [place] contractions now, which are painless and prepare the uterus for the delivery.    Nutrition: During this time, you may find that your nausea and fatigue are gone. Overall, you may feel better and have more energy than you did in your first trimester. Be sure you are getting enough calcium and iron in your diet. Your prenatal vitamins cannot supply all of the nutrients you need, so continue to eat 3-4 servings of dairy foods and 2-3 servings of meat/fish/poultry/nuts every day. Foods high in iron include: red meats, eggs, dark green vegetables, dark yellow vegetables, nuts, kidney beans and chickpeas. Some cereals are fortified with iron, so look at the food labels for 100% of the daily requirement for iron.     Discuss your work situation with your midwife or physician as needed. If you stand for long periods of time, you may need to make changes and take breaks.    [place] for childbirth and parenting classes, including an infant CPR class. Breastfeeding classes are recommended too.    Childbirth and Parenting Education:   [place] parenting center: http://DoubleMap[facility] District HospitalSwaptree Inc./   [phone]  [place]: (education, yoga & wellness) www.Ruby [employer]oupe  Enlightened Mama: www.WikiaenedPlaySight.Multifonds   Childbirth collective: (Parent topic nights)  www.childbirthcollective.org/  Hypnobabies:  www.hypnobabiestwincities.com/  Hypnobirthing:  Http://hypnobirthing.com/    Book Recommendations:   Lalita Charles's Birthing From Within--first few chapters include a new-age tone, you may prefer to skip it and keep going, because there is good stuff later.  This book recommendation covers emotional preparation, but does cover coping with pain, and use of  "both pharmacological and nonpharmacological methods.    Dr. Duque' The Pregnancy Book and The Birth Book--the pregnancy book goes month-by month    Womanly Art of Breastfeeding by La Leche League International   Bestfeeding by Sylvia Purdy--great pictures    Mothering Your Nursing Toddler, by Molly Ding.   Addresses dealing with so many of the challenging behaviors of a nursing toddler.  How Weaning Happens, by La Leche League.  Discusses weaning at all ages, from medically necessary weaning of an infant, all the way up to age 5 (or older), with why/why not, and strategies.  Very empowering book both for deciding to wean and deciding not to.    American College of Nurse-Midwives (ACNM) http://www.midwife.org/; look at the informational handouts at http://www.midwife.org/Share-With-Women     www.mymidwife.org    Mother to Baby (Medication and Herbal guidance in pregnancy): http://www.mothertobaby.org  Toll-Free Hotline: 215.836.3250  LactMed (Medication use while breastfeeding): http://toxnet.nlm.nih.gov/newtoxnet/lactmed.htm    Women's Health.gov:  http://www.womenshealth.gov/a-z-topics/index.html    American pregnancy association - http://americanpregnancy.org    Centering Pregnancy (group prenatal care option): http://centeringhealthcare.org    Information about doulas:  Childbirth collective: http://www.childbirthcollective.org/  Doulas of [place] (BRENDA):  www.brenda.org  [facility]  project: http://twincitiesdoulaproject.com/     Early Childhood and Family Education (ECFE):  ECFE offers parents hands-on learning experiences that will nourish a lifetime of teachable moments.  http://ecfe.info/ecfe-home/    March of Dimes www.Sara Campbell.Hartman Wright     FDA - Nutrition  www.mypyramid.gov  Under \"For Consumers,\" click on \"pregnant and breastfeeding women.\"      Centers for Disease Control and Prevention (CDC) - Vaccines : http://www.cdc.gov/vaccines/       When researching information on the web, " question the validity of websites.  The [employer]tree .gov, .edu and.org tend to be more reliable information.  If there are a lot of advertisements, be cautious of the information provided. Stay away from blogs and chat rooms please!    How can you care for yourself at home?   You can refer to the Starting Out Right book or find it online at http://www.[facility]altheast.org/images/stories/maternity/[facility]-Starting-Out-Right.pdf or http://www.healtheast.org/images/stories/flipbooks/[facility]-starting-out-right/[facility]-starting-out-right.html#p=8     You can sign up for a weekly parenting e-mail that gives support, tips and advice from health care professionals that starts with pregnancy and continues through the toddler years. To register, go to www.[employer].org/baby at any time during your pregnancy.      Baby Feeding in the Hospital: Information, Support and Resources    As you prepare for the birth of your child, you will want to consider options for feeding your baby including breast-feeding and/or baby formula. The American Academy of Pediatrics recommends exclusive breast-feeding for the first six months (although any amount of breast-feeding is beneficial).  However, we also understand that breast-feeding is a personal choice and not for everyone. Whether or not you choose to breast-feed, your decision will be respected by our staff.    There are numerous benefits of breast-feeding; here are a few to consider:    Provides antibodies to protect your baby from infections and diseases    The cost: formula can cost over $1,500 per year    Convenience, no warming up or sterilizing bottles and supplies    The physical contact with breastfeeding can make babies feel secure, warm and comforted    What ever my feeding choice, what can I expect after I deliver my baby?    Your baby will usually be placed skin-to-skin immediately following birth. The skin to skin contact between you and your baby will be a special and  memorable time. The bonding and attachment comforts your baby and has a positive effect on baby s brain development.     Having your baby  room in  with you also helps you start to learn your baby s body rhythms and sleep cycle.      You will also begin to learn your baby s cues (signals) that he or she is ready to feed.    When do I start to feed my baby?  As soon as possible after your baby s birth, you will be encouraged to begin feeding.  In the first couple of weeks, your baby will eat often.  Breastfeeding babies usually eat at least 8 times in 24 hours.  Babies fed formula usually eat at least 7 times in 24 hours.      Breast-feeding tips:    Get comfortable and use pillows for support.    Have your baby at the level of your breast, facing you,  tummy to tummy .      Touch your nipple to your baby s lips so you baby s mouth opens wide (rooting reflex).  Aim the nipple toward the roof of your baby s mouth. When your baby opens his or her mouth, pull your baby toward your breast to help your baby  latch on  to your nipple and much of the areola area.    Hand expressing your breast milk can assist with latching your baby to your breast, if needed.    Ask for help, breastfeeding may seem awkward or uncomfortable at first, this is normal. There are numerous resources available at [facility] and beyond.     If your goal is to exclusively breastfeed, avoid using any formula or artificial nipples (including bottles and pacifiers) while you are your baby are learning to breastfeed unless there is a medical reason.       Mixing breastfeeding and formula can interfere with how you begin building your milk supply.  It can impact how you and your baby  learn  to breastfeeding together and alter the natural growth of  good  bacteria in your baby s stomach.    Delay a pacifier or a bottle in the first few weeks until breastfeeding is well established. This is often around 3 weeks of age.    Ask your nurse  to show you how to hand express.   Breast milk can be kept in the refrigerator or freezer for later use.        Touring the [facility]  To schedule a tour of [facility], call [phone]    To schedule a tour of [facility], call [phone]    Hospital and Clinic  Resources:  -Schedule an appointment with a [facility] CNM who is also a Lactation Consultant by calling [phone]     -Schedule a clinic appointment with a [facility] CNM with dedicated clinic hours for breastfeeding assistance by calling [phone]. Breastfeeding clinic visits are at [facility] on Wednesdays, [facility] on Tuesdays and [facility] on Thursdays.     [facility] Lactation Clinics located at [facility], [facility] and [facility]  Call: [phone].    Inpatient support    Outpatient appointments    Telephone consultation    Breast-feeding classes available through Contapps      -Attend a baby weigh in at [facility].  Lactation consultants are available to answer questions  [place]: Tuesdays 1:00 - 2:00  [facility]: Mondays 1:00 - 2:00  www.Contapps    -Attend one [facility] the [facility] groups at [facility] in [facility].  [facility] also offers one-on-one in home and in office lactation consults.   www.[facility].Tattva    -Attend a Jenae League meeting.  Multiple groups in several locations throughout the [facility]. The meetings are no-cost and always informative breastfeeding education session through [employer] La Leche League  Www.andrewfmfreya.org/  Medication use while breastfeeding: http://toxnet.nlm.nih.gov/newtoxnet/lactmed.htm     Online Resources:    [facility].org/baby sign up for free online weekly e-mail    [facility]theast.org/maternity    Breastfeedingmadesimple.com    Llli.org (La Leche League)    Normalfed.com    Womenshealth.gov/breastfeeding    Workandpump.com    Breast-feeding Supplies  & Pumps:  Talk to your insurance provider or WIC (Women, Infants and Children) to learn more about options available to you. Recent health insurance changes may include additional coverage for supplies and pumps.    Public Health:  Women, Infants and Children Nutrition program (WIC): provides breast-feeding support and education in addition to formal feeding moms. 421-EFR-5266 or http://www.health.[facility].mn.us/divs/fh/wic    Family Health Home Visiting: Public [facility] Nurse home visits are available. Talk to your provider to see if you qualify. Most [facility] have a program available.    Additional Resources:  La Leche League is an international, nonprofit, nonsectarian organization offering information, education, and support to mothers who want to breast-feed their babies. Local groups offer phone help and monthly meetings. Visit Belly Ballot.datango or Brainceuticals.org and us the  Find local support  drop down menu or click on the  Resources  tab.    [place] Breastfeeding Resources: 0-745-491-BABY (9169) toll free    National Breastfeeding Help Line trained breastfeeding peer counselors can help answer common breast-feeding questions by phone. Monday-Friday: English/Palestinian  2-128- [phone] toll free, 1-912.399.9130 (TTY)    [facility] Connection: [phone] (4196)

## 2021-06-23 NOTE — TELEPHONE ENCOUNTER
Telephone call to patient.  Discussed the common side effects of the Tamiflu that are consistent with her experience after taking it.  Patient wondering if she should continue to take it or not.  She experiences severe headache and nausea and vomiting.  She has not been eating much, but will try to have a little more food with her next dose.  Offered patient some Zofran, but she declined, stating that she has taken that before and did not like the way it made her feel.  Plans to take tylenol right away to prevent the headache.  Patient plans to discontinue the Tamiflu if she has the same reaction that she has had after the last 2 doses.  Baby has been active, patient to call with any concerns re: decreased fetal movement.  Patient encouraged to hydrate well and eat small snacks when able and get a lot of rest.  Also encourage patient to call or be seen if sxs worsen as she is vulerable to complication because of her pregnancy. Patient states understanding and is in agreement with the plan.  All questions answered. REYES Bailon,CNM     EMILY LITTLEJOHN    [id number]    87y      Male    Patient is a 87y old  Male who presents with a chief complaint of Weakness (23 Jul 2017 12:44)      INTERVAL HPI/OVERNIGHT EVENTS:    Patient is having some RUQ pain, denies having any fever, chills, chest pain, nausea, vomiting, dizziness.     REVIEW OF SYSTEMS:    CONSTITUTIONAL: No fever, some fatigue  RESPIRATORY: No cough; No shortness of breath  CARDIOVASCULAR: No chest pain, palpitations  GASTROINTESTINAL: No abdominal or epigastric pain. No nausea, vomiting  NEUROLOGICAL: No headaches,  loss of strength.  MISCELLANEOUS: No joint swelling or swelling.        Vital Signs Last 24 Hrs  T(C): 36 (28 Jul 2017 07:34), Max: 37.5 (28 Jul 2017 00:13)  T(F): 96.8 (28 Jul 2017 07:34), Max: 99.5 (28 Jul 2017 00:13)  HR: 84 (28 Jul 2017 07:34) (84 - 91)  BP: 90/42 (28 Jul 2017 07:34) (90/42 - 108/56)  RR: 18 (28 Jul 2017 00:13) (18 - 18)  SpO2: 98% (28 Jul 2017 07:34) (98% - 100%)      Physical exam.     GENERAL: Elderly male looking comfortable.   HEAD:  Atraumatic, Normocephalic  EYES: right eye conjunctival injection with haziness of the cornea.   NECK: Supple, No JVD, Normal thyroid  NERVOUS SYSTEM: AOX2  CHEST/LUNG: Clear to auscultation bilaterally; No, rhonchi, right upper chest permacath  HEART: Regular rate and rhythm; No murmurs, left upper chest AICD  ABDOMEN: Soft, Nontender, Nondistended; Bowel sounds present  EXTREMITIES:  1+ Peripheral Pulses, No edema  SKIN: some chronic skin changes with healing ulcer of the big toe      LABS:                        8.4    5.2   )-----------( 79       ( 28 Jul 2017 08:03 )             28.0     07-28    137  |  96<L>  |  28.0<H>  ----------------------------<  106  4.2   |  25.0  |  3.93<H>    Ca    8.5<L>      28 Jul 2017 08:03    TPro  6.8  /  Alb  3.3  /  TBili  0.4  /  DBili  x   /  AST  98<H>  /  ALT  232<H>  /  AlkPhos  101  07-28            I&O's Summary    27 Jul 2017 07:01  -  28 Jul 2017 07:00  --------------------------------------------------------  IN: 0 mL / OUT: 1600 mL / NET: -1600 mL        MEDICATIONS  (STANDING):  heparin  Injectable 5000 Unit(s) SubCutaneous every 12 hours  tamsulosin 0.4 milliGRAM(s) Oral at bedtime  gabapentin 300 milliGRAM(s) Oral three times a day  dextrose 5%. 1000 milliLiter(s) (50 mL/Hr) IV Continuous <Continuous>  dextrose 50% Injectable 12.5 Gram(s) IV Push once  dextrose 50% Injectable 25 Gram(s) IV Push once  dextrose 50% Injectable 25 Gram(s) IV Push once  polyethylene glycol 3350 17 Gram(s) Oral at bedtime  midodrine 5 milliGRAM(s) Oral daily  artificial  tears Solution 1 Drop(s) Right EYE two times a day  erythromycin   Ointment 1 Application(s) Right EYE at bedtime  epoetin una Injectable 38191 Unit(s) IV Push <User Schedule>  folic acid 1 milliGRAM(s) Oral daily  insulin lispro (HumaLOG) corrective regimen sliding scale   SubCutaneous Before meals and at bedtime  Nephro-viviana 1 Tablet(s) Oral daily  ferrous    sulfate 325 milliGRAM(s) Oral daily  ofloxacin 0.3% Solution 1 Drop(s) Right EYE <User Schedule>  vancomycin  IVPB 1000 milliGRAM(s) IV Intermittent <User Schedule>  acetaminophen   Tablet. 650 milliGRAM(s) Oral every 8 hours    MEDICATIONS  (PRN):  dextrose Gel 1 Dose(s) Oral once PRN Blood Glucose LESS THAN 70 milliGRAM(s)/deciliter  glucagon  Injectable 1 milliGRAM(s) IntraMuscular once PRN Glucose LESS THAN 70 milligrams/deciliter  guaiFENesin    Syrup 100 milliGRAM(s) Oral every 6 hours PRN Cough  traMADol 25 milliGRAM(s) Oral every 6 hours PRN moderate to severe pain

## 2021-06-24 NOTE — PROGRESS NOTES
Zahraa Caicedo presents alone today.  Reports normal fetal movement. Denies cramping, bleeding or leaking of fluid. Reports increased BH contractions compared to other pregnancies. Reports some nausea after eating high sugar meals, discussed physiology. Second trimester lab testing today.  Tests include 1 hr GCT, HGB, RPR . Hep C also drawn and reviewed water-birth consent form. Recommend Tdap vaccine around 30 weeks, plan to RTC in 4 weeks.   Reviewed warning signs with emphasis on  labor/contractions and fetal movement.   Reviewed feeding choice.  Plan for MARSHA and position check at 36 weeks due to history of polyhydramnios and malpresentation.

## 2021-06-24 NOTE — PATIENT INSTRUCTIONS - HE
[facility] Nurse Midwives - Contact information:  Appointment line and to get a hold of CNM in clinic Monday-Friday 8 am - 5 pm:  [phone].  There are some clinics with early start times (1st appointment 7:40 am) and others with evening hours (last appointment 6:20 pm).  Most are typically open from 8 am to 5 pm.    CNM on call answering service: [phone].  Specify your hospital of choice and leave a brief message for CNM;  will then page CNM who is on call at your specified hospital and you should receive a call back with 15 minutes.  Be sure that your ringer is audible and that you can accept blocked calls so that we can get back in touch with you! This number should be reserved for urgent needs if during the day, before 8 am, after 5 pm, weekends, holidays.    Contact the on-call CNM with warning signs, such as:    vaginal bleeding     Vaginal discharge and itching or pain and burning during urination    Leg/calf pain or swelling on one side    severe abdominal pain    nausea and vomiting more than 4-5 times a day, or if you are unable to keep anything down    fever more than 100.4 degrees F.          You are invited to  Meet the [facility] Nurse-Midwives  A way to tour the hospital Labor and Delivery unit and meet the midwives that attend births since you may not have the opportunity to meet them during your prenatal care.  Some sessions are informal meet and greet type social hours, others address a specific concern or topic.    2019 7-8pm  [facility]    2019 7-8pm  [facility] Aud[facility]    Thursday, August 15, 2019 7-8pm  [facility]    2019 7-8 pm  [facility], [facility]    Please call [phone] to register      UNDERSTANDING  LABOR    Going into labor before your 37th week of pregnancy is called  labor.   labor can cause your baby to be born too soon. This can lead to a number of health problems that may affect your baby. From 28-35 weeks, Patients are advised to be evaluated at [facility] since they have a  Intensive Care Unit (NICU).  -Before labor, the cervix is thick and closed.  -In  labor, the cervix begins to efface (thin) and dilate (open) over a short period of time    Symptoms of  Labor  If you believe you re having  labor, contact the midwives right away. But contractions alone don t mean you re in  labor. What matters more are changes in your cervix (the lower end of the uterus). Symptoms of  labor include:    five or more contractions per hour    Strong & frequent contractions    Constant menstrual-like cramping    Low-back pain    Mucous or bloody vaginal discharge    Bleeding or spotting in the second or third trimester    Evaluating  Labor  Your midwife will try to find out whether you re in  labor or whether you re just having contractions.She may watch you for a few hours. The following tests may be done:    Pelvic exam to see if your cervix has effaced (thinned) and dilated (opened)    Uterine activity monitoring to detect contractions    Fetal monitoring to check the health of your baby    Ultrasound to check your baby s size and position    Caring for Yourself At Home  If you have contractions  but your cervix is still thick and closed, the midwife may ask you to do the following at home:    Drink plenty of water.    Do fewer activities.    Rest in bed on your side.    Avoid intercourse and nipple stimulation.    When to Call Your Midwife    Five or more contractions per hour    Bag of water breaks    Bleeding or spotting     If You Need Hospital Care   labor often requires that you have hospital care and complete bed rest. You may have an IV (intravenous) line to get fluids. And you may be given pills or  an injection to help prevent contractions. Finally, you may receive medication (corticosteroids) that helps your baby s lungs mature more quickly.    Are You At Risk?  Any pregnant woman can have  labor. It may start for no reason. But these risk factors can increase your chances:    Past  labor or past early birth    Smoking and drug or alcohol use during pregnancy    Multiple fetuses (twins or more)    Problems with the shape of the uterus    Bleeding during the pregnancy    The Dangers of  Birth  A baby born too soon may have health problems. This is because the baby didn t have enough time to mature. The baby then is at risk of:    Not breastfeeding well    Having immature lungs    Bleeding in the brain    Dying    Reaching Term  Your goal is to get as close to term as you can before giving birth. The closer you get to term, the higher your chance of having a healthy baby. Work with your healthcare provider. Together, you can take steps that may keep you from giving birth too early.        Testing for Gestational Diabetes in Pregnancy  [facility] are committed to providing safe care during your pregnancy. We follow the recommendations of the American Diabetes Association and the American College of Obstetricians and Gynecologists to test all pregnant women for gestational diabetes. Testing early in pregnancy (if you have risk factors) and testing all women between 26-28 weeks follows local and national guidelines for care during pregnancy. Clients who feel that they cannot consent to such testing, may choose to transfer their care to our consultant obstetricians.  What is the test?  Eat normally on the day of the test; a diet rich in protein, whole grains, and vegetables would be best. Avoid simple sugars, white flour products and juices prior to testing.  You will be asked to drink a 10 oz glucose beverage (50 gm, about the same as a can of root beer).  The product is not  carbonated as it has to be consumed within 5 minutes. During the next hour, you are seen for a prenatal visit, but are asked to limit your activity around the clinic. Feel free to bring a book or your computer.   Any level less than 140 for this  glucose challenge test  is considered normal. If measured at 140 to 185, the diagnostic test, a  3 hour glucose tolerance test  will be conducted. If 2 or more readings are abnormal, the diagnosis of gestational diabetes is confirmed and a referral to a diabetes educator will be made. If the level is 185 or above, this confirms the diagnosis and a referral will be made without administering the 3 hour test.  A fasting blood sugar may be performed sometime in the first trimester if you have risk factors for the development of gestational diabetes such as a previous diagnosis or birth of a large baby or a close family relative with diabetes.  What is gestational diabetes?  Your body converts what you eat into glucose. In response to rising blood sugar levels it secretes insulin in order to be able to utilize that glucose. During pregnancy as the placenta grows and matures, it secretes hormones that are necessary to assure baby s growth and development, however, they also reduce the action of insulin in the mother. In some cases too much insulin is blocked (this is called  insulin resistance ) and blood sugar in the mother rises above a normal level. Pregnant women who have never had diabetes before but who have high blood sugar (glucose) levels during pregnancy are said to have gestational diabetes. Gestational diabetes affects about 4-7% of all pregnancies.  What if I have gestational diabetes?  If either of the tests for gestational diabetes show that you have this condition, a referral will be made to visit with the diabetes educator. The educator will help you to make wise food choices, count carbohydrates, monitor your blood sugar and record your levels in a journal. We  ask that you bring this diary with you at each prenatal visit. You may meet with the educator several times during the remainder of your pregnancy.  How gestational diabetes can affect your baby  Some mothers may wonder why testing for GDM is delayed until the early part of the 3rd trimester for most women. Gestational diabetes has an impact on the baby at the time of rapid body growth. When the mother s blood has elevated sugar levels, extra glucose crosses the placenta causing the baby to have excess weight gain ( macrosomia ). Some babies are too big to be born vaginally so their mother must have  births. Babies of mothers with uncontrolled gestational diabetes may have difficult births that can cause trauma to the mother and in rare circumstances, babies may suffer fractures or oxygen deprivation during birth. They may have difficulty maintaining their own blood sugar after birth and they may also have trouble adapting to life outside. Recent research indicates that babies of mothers with uncontrolled or undiagnosed gestational diabetes are at risk for obesity and type 2 diabetes. Women who develop gestational diabetes are more likely to develop type 2 diabetes within 15 years after their pregnancy.  Additional Information  The American College of Nurse Midwives (ACNM) provides an information sheet describing diabetes screening in pregnancy: http://www.womensdocs.com/jo/pdf/Second_Trimester/Gestational_Diabetes.pdf    You can visit the American Diabetes Association website http://www.diabetes.org for additional information and to purchase their book,  Gestational Diabetes: What to Expect .    A brochure from the American College of Obstetrics and Gynecology is available at:   http://www.acog.org/~/media/For%20Patients/beq555.pdf?dmc=1&vk=[id number]  Testing for gestational diabetes is a critical part of your prenatal journey. Thank you for taking good care of yourself and your baby.    HEALTHY  PREGNANCY CARE: 22-26 WEEKS PREGNANT    You are finishing your second trimester. Your baby is developing rapidly. At this stage, babies have a sense of balance, can respond to touch, and are recognizing parent voices.  Your baby will be moving around more now.  You may notice [place] contractions now, which are painless and prepare the uterus for the delivery.    Nutrition: During this time, you may find that your nausea and fatigue are gone. Overall, you may feel better and have more energy than you did in your first trimester. Be sure you are getting enough calcium and iron in your diet. Your prenatal vitamins cannot supply all of the nutrients you need, so continue to eat 3-4 servings of dairy foods and 2-3 servings of meat/fish/poultry/nuts every day. Foods high in iron include: red meats, eggs, dark green vegetables, dark yellow vegetables, nuts, kidney beans and chickpeas. Some cereals are fortified with iron, so look at the food labels for 100% of the daily requirement for iron.     Discuss your work situation with your midwife or physician as needed. If you stand for long periods of time, you may need to make changes and take breaks.    [place] for childbirth and parenting classes, including an infant CPR class. Breastfeeding classes are recommended too.    Childbirth and Parenting Education:   [place] parenting center: http://SmartProcureEast Los Angeles Doctors Hospitalcodetag/   ([phone]-BABY  [place]: (education, yoga & wellness) www.ZingCheckout  Enlightened Mama: www.[employer] The[employer]enedBusiness [place].Typerings.com   Childbirth collective: (Parent topic nights)  www.childbirthcollective.org/  Hypnobabies:  www.hypnobabiestwincities.com/  Hypnobirthing:  Http://hypnobirthing.com/    Book Recommendations:   Lalita Charles's Birthing From Within--first few chapters include a new-age tone, you may prefer to skip it and keep going, because there is good stuff later.  This book recommendation covers emotional preparation, but does cover coping with pain, and use of  "both pharmacological and nonpharmacological methods.    Dr. Duque' The Pregnancy Book and The Birth Book--the pregnancy book goes month-by month    Womanly Art of Breastfeeding by La Leche League International   Bestfeeding by Sylvia Purdy--great pictures    Mothering Your Nursing Toddler, by Molly Ding.   Addresses dealing with so many of the challenging behaviors of a nursing toddler.  How Weaning Happens, by La Leche League.  Discusses weaning at all ages, from medically necessary weaning of an infant, all the way up to age 5 (or older), with why/why not, and strategies.  Very empowering book both for deciding to wean and deciding not to.    American College of Nurse-Midwives (ACNM) http://www.midwife.org/; look at the informational handouts at http://www.midwife.org/Share-With-Women     www.mymidwife.org    Mother to Baby (Medication and Herbal guidance in pregnancy): http://www.mothertobaby.org  Toll-Free Hotline: 437.817.9718  LactMed (Medication use while breastfeeding): http://toxnet.nlm.nih.gov/newtoxnet/lactmed.htm    Women's Health.gov:  http://www.womenshealth.gov/a-z-topics/index.html    American pregnancy association - http://americanpregnancy.org    Centering Pregnancy (group prenatal care option): http://centeringhealthcare.org    Information about doulas:  Childbirth collective: http://www.childbirthcollective.org/  Doulas of [place] (BRENDA):  www.brenda.org  [facility]  project: http://twincitiesdoulaproject.com/     Early Childhood and Family Education (ECFE):  ECFE offers parents hands-on learning experiences that will nourish a lifetime of teachable moments.  http://ecfe.info/ecfe-home/    March of Dimes www.KuGou.frooly     FDA - Nutrition  www.mypyramid.gov  Under \"For Consumers,\" click on \"pregnant and breastfeeding women.\"      Centers for Disease Control and Prevention (CDC) - Vaccines : http://www.cdc.gov/vaccines/       When researching information on the web, " question the validity of websites.  The [place] .gov, .edu and.org tend to be more reliable information.  If there are a lot of advertisements, be cautious of the information provided. Stay away from blogs and chat rooms please!    How can you care for yourself at home?   You can refer to the Starting Out Right book or find it online at http://www.[facility]altheast.org/images/stories/maternity/[facility]-Starting-Out-Right.pdf or http://www.healtheast.org/images/stories/flipbooks/[facility]-starting-out-right/[facility]-starting-out-right.html#p=8     You can sign up for a weekly parenting e-mail that gives support, tips and advice from health care professionals that starts with pregnancy and continues through the toddler years. To register, go to www.Atreaon.org/baby at any time during your pregnancy.      Baby Feeding in the Hospital: Information, Support and Resources    As you prepare for the birth of your child, you will want to consider options for feeding your baby including breast-feeding and/or baby formula. The American Academy of Pediatrics recommends exclusive breast-feeding for the first six months (although any amount of breast-feeding is beneficial).  However, we also understand that breast-feeding is a personal choice and not for everyone. Whether or not you choose to breast-feed, your decision will be respected by our staff.    There are numerous benefits of breast-feeding; here are a few to consider:    Provides antibodies to protect your baby from infections and diseases    The cost: formula can cost over $1,500 per year    Convenience, no warming up or sterilizing bottles and supplies    The physical contact with breastfeeding can make babies feel secure, warm and comforted    What ever my feeding choice, what can I expect after I deliver my baby?    Your baby will usually be placed skin-to-skin immediately following birth. The skin to skin contact between you and your baby will be a special and  memorable time. The bonding and attachment comforts your baby and has a positive effect on baby s brain development.     Having your baby  room in  with you also helps you start to learn your baby s body rhythms and sleep cycle.      You will also begin to learn your baby s cues (signals) that he or she is ready to feed.    When do I start to feed my baby?  As soon as possible after your baby s birth, you will be encouraged to begin feeding.  In the first couple of weeks, your baby will eat often.  Breastfeeding babies usually eat at least 8 times in 24 hours.  Babies fed formula usually eat at least 7 times in 24 hours.      Breast-feeding tips:    Get comfortable and use pillows for support.    Have your baby at the level of your breast, facing you,  tummy to tummy .      Touch your nipple to your baby s lips so you baby s mouth opens wide (rooting reflex).  Aim the nipple toward the roof of your baby s mouth. When your baby opens his or her mouth, pull your baby toward your breast to help your baby  latch on  to your nipple and much of the areola area.    Hand expressing your breast milk can assist with latching your baby to your breast, if needed.    Ask for help, breastfeeding may seem awkward or uncomfortable at first, this is normal. There are numerous resources available at [facility] and beyond.     If your goal is to exclusively breastfeed, avoid using any formula or artificial nipples (including bottles and pacifiers) while you are your baby are learning to breastfeed unless there is a medical reason.       Mixing breastfeeding and formula can interfere with how you begin building your milk supply.  It can impact how you and your baby  learn  to breastfeeding together and alter the natural growth of  good  bacteria in your baby s stomach.    Delay a pacifier or a bottle in the first few weeks until breastfeeding is well established. This is often around 3 weeks of age.    Ask your nurse  to show you how to hand express.   Breast milk can be kept in the refrigerator or freezer for later use.        Touring the [facility]  To schedule a tour of [facility], call [phone]    To schedule a tour of [facility], call [phone]    Hospital and Clinic  Resources:  -Schedule an appointment with a [facility] CNM who is also a Lactation Consultant by calling [phone]     -Schedule a clinic appointment with a [facility] CNM with dedicated clinic hours for breastfeeding assistance by calling [phone]. Breastfeeding clinic visits are at [facility] on Wednesdays, [facility] on Tuesdays and [facility] on Thursdays.     [facility] Lactation Clinics located at [facility], [facility] and [facility]  Call: [phone].    Inpatient support    Outpatient appointments    Telephone consultation    Breast-feeding classes available through [employer]      -Attend a baby weigh in at [facility].  Lactation consultants are available to answer questions  [place]: Tuesdays 1:00 - 2:00  [facility]: Mondays 1:00 - 2:00  www.[employer]    -Attend one of the [facility] groups at [facility] in [facility].  [facility] also offers one-on-one in home and in office lactation consults.   www.[facility].Jammin Java    -Attend a Jenae League meeting.  Multiple groups in several locations throughout the [facility]. The meetings are no-cost and always informative breastfeeding education session through [employer] La Leche League  Www.andrewfmfreya.org/  Medication use while breastfeeding: http://toxnet.nlm.nih.gov/newtoxnet/lactmed.htm     Online Resources:    [facility].org/baby sign up for free online weekly e-mail    [facility].org/maternity    Breastfeedingmadesimple.com    Llli.org (La Leche League)    Normalfed.com    Womenshealth.gov/breastfeeding    Workandpump.com    Breast-feeding Supplies  & Pumps:  Talk to your insurance provider or WIC (Women, Infants and Children) to learn more about options available to you. Recent health insurance changes may include additional coverage for supplies and pumps.    Public Health:  Women, Infants and Children Nutrition program (WIC): provides breast-feeding support and education in addition to formal feeding moms. 928-ZCS-9709 or http://www.health.[facility].mn.us/divs/fh/wic    Family Health Home Visiting: [facility] Nurse home visits are available. Talk to your provider to see if you qualify. Most [facility] have a program available.    Additional Resources:  La Leche League is an international, nonprofit, nonsectarian organization offering information, education, and support to mothers who want to breast-feed their babies. Local groups offer phone help and monthly meetings. Visit First Meta.Notehall or [place].org and us the  Find local support  drop down menu or click on the  Resources  tab.    [place] Breastfeeding Resources: 9-144-206-BABY (6029) toll free    National Breastfeeding Help Line trained breastfeeding peer counselors can help answer common breast-feeding questions by phone. Monday-Friday: English/[place]  0-688- 826-9662 toll free, 1-816.177.8858 (TTY)    [facility] Connection: 025-654-[facility] (9307)

## 2021-06-25 NOTE — PATIENT INSTRUCTIONS - HE
Patient Education   HEALTHY PREGNANCY CARE: 30-34 WEEKS PREGNANT    You have made it to the final months of your pregnancy. By now, your baby is starting to fill out with some fat under his skin, fuzzy hair on his shoulders, and is gaining 4 to 6 ounces per week.    Discuss any travel plans with your midwife or physician.    Review possible changes in sexuality during later pregnancy and discuss these with your midwife or physician, as well as your partner. Alternative love-making positions may be more comfortable.    Discuss labor and delivery issues with your midwife or physician. If you had a  birth in the past, discuss a trial of labor with your midwife or physician. He or she may ask that you sign a consent form, if you wish to have a vaginal birth after  (). Ask your midwife or physician to explain your options for managing pain during your labor and delivery. Sometimes, during the birth process, an episiotomy may need to be cut in the vagina to make the opening bigger or let the baby come out quicker. You may want to discuss the episiotomy and how often it is needed with your midwife or physician.    Plan for your baby's care by selecting a child health care provider (Family physician, Pediatrician, or Pediatric Nurse Practitioner). Practice installing an infant car seat correctly in the car. Ask for car seat information as needed and make sure it is safe and will work in the car your baby will ride in. You will need a car seat to bring your baby home from the hospital. Check the procedure for adding your baby to your health care plan. Review your decision about circumcision and ask for any information you need. As you buy and receive items for your baby, don't put a baby walker on your list. Walkers can be dangerous and can cause serious injury to your child. A safer option is a saucer-type play station, since it doesn't allow baby to travel across the floor.    Discuss your choices and  plans for birth control with your midwife or physician. Women who are breastfeeding can still become pregnant. Use a birth control method if you want to lower your pregnancy risk. Talk to your midwife or physician if you are considering permanent birth control, such as tubal ligation or Essure. You may need to complete a consent form 30 days prior to delivery in order to have this done after you deliver.    Continue to watch for signs and symptoms of preeclampsia:     Sudden swelling of your face, hands, or feet     New vision problems such as blurring, double vision, or flashing lights    A severe headache not relieved with acetaminophen (Tylenol)    Sharp or stabbing pain in your right or middle upper abdomen    Watch for signs and symptoms of premature labor:     Regular contractions. This means having about 6 or more within 1 hour, even after you have had a glass of water and are resting.     A backache that starts and stops regularly.    An increase or change in vaginal discharge, such as heavy, mucus-like, watery, or bloody discharge.     Your water breaks or leaks.    If you have any of the above symptoms or any other concerns, call your provider or their clinic staff at [facility] at Phone: [phone]. If it's after clinic hours, physician patients should call the [facility] at [phone] ([phone]); midwife patients should call their answering service at [phone].    How can you care for yourself at home?   You can refer to the Starting Out Right book or find it online at [email or web address] or [email or web address]     You can sign up for a weekly parenting e-mail that gives support, tips and advice from health care professionals that starts with pregnancy and continues through the toddler years. To register, go to  [email or web address] at any time during your pregnancy.

## 2021-06-25 NOTE — PROGRESS NOTES
Zahraa presents to clinic with her 2 young children.   She reports active fetal movement.  She has also been experiencing headaches and light sensitivity.  They do resolve. She has been drinking 75 oz of water daily. Encourage tylenol and avoiding triggers and to call the CNM on call if her headache doesn't resolve in the usual(for her)time frame.  Weight gain within normal limits.  Pregnancy checklist updated.  Reviewed third trimester warning signs and symptoms.  Patient advised to call the midwife on-call in the case that she experiences susu red vaginal bleeding, watery leaking of vaginal fluid, greater than 6 regular contractions in 1 hour, or decreased fetal movement.  Also reviewed signs and symptoms of preeclampsia and recommended patient call in the case that she develops a headache that does not respond to Tylenol, blurred vision/spots/floaters in her vision, or persistent abdominal pain.    Offered Tdap today.  Patient accepts. Return to clinic in 2 weeks. Patient desires BPP at 36 weeks for MARSHA and fetal position due to polyhydramnios in previous pregnancy.

## 2021-06-26 ENCOUNTER — HEALTH MAINTENANCE LETTER (OUTPATIENT)
Age: 31
End: 2021-06-26

## 2021-07-14 PROBLEM — O40.3XX0 POLYHYDRAMNIOS IN THIRD TRIMESTER: Status: RESOLVED | Noted: 2017-07-10 | Resolved: 2017-08-28

## 2021-07-14 PROBLEM — Z30.017 NEXPLANON INSERTION: Status: RESOLVED | Noted: 2019-07-12 | Resolved: 2020-08-25

## 2021-07-14 PROBLEM — Z37.9 NORMAL LABOR: Status: RESOLVED | Noted: 2019-05-29 | Resolved: 2019-05-29

## 2021-07-14 PROBLEM — O92.29 POSTPARTUM NIPPLE PAIN: Status: RESOLVED | Noted: 2017-10-09 | Resolved: 2018-10-22

## 2021-07-14 PROBLEM — Z34.90 PREGNANT: Status: RESOLVED | Noted: 2017-07-10 | Resolved: 2017-07-11

## 2021-07-14 PROBLEM — Z30.431 IUD CHECK UP: Status: RESOLVED | Noted: 2017-10-09 | Resolved: 2018-10-22

## 2021-07-14 PROBLEM — Z34.80 ENCOUNTER FOR SUPERVISION OF OTHER NORMAL PREGNANCY: Status: RESOLVED | Noted: 2017-05-31 | Resolved: 2017-07-11

## 2021-07-14 PROBLEM — O32.0XX0 UNSTABLE LIE OF FETUS: Status: RESOLVED | Noted: 2017-06-12 | Resolved: 2017-08-28

## 2021-07-14 PROBLEM — O48.0 POST-TERM PREGNANCY, 40-42 WEEKS OF GESTATION: Status: RESOLVED | Noted: 2017-07-11 | Resolved: 2018-10-22

## 2021-10-16 ENCOUNTER — HEALTH MAINTENANCE LETTER (OUTPATIENT)
Age: 31
End: 2021-10-16

## 2022-02-17 PROBLEM — Z34.80 SUPERVISION OF OTHER NORMAL PREGNANCY, ANTEPARTUM: Status: RESOLVED | Noted: 2018-10-22 | Resolved: 2019-05-31

## 2022-07-17 ENCOUNTER — HEALTH MAINTENANCE LETTER (OUTPATIENT)
Age: 32
End: 2022-07-17

## 2022-09-25 ENCOUNTER — HEALTH MAINTENANCE LETTER (OUTPATIENT)
Age: 32
End: 2022-09-25
